# Patient Record
Sex: MALE | Race: WHITE
[De-identification: names, ages, dates, MRNs, and addresses within clinical notes are randomized per-mention and may not be internally consistent; named-entity substitution may affect disease eponyms.]

---

## 2017-02-18 ENCOUNTER — HOSPITAL ENCOUNTER (EMERGENCY)
Dept: HOSPITAL 75 - ER | Age: 37
Discharge: HOME | End: 2017-02-18
Payer: SELF-PAY

## 2017-02-18 VITALS — HEIGHT: 73 IN | BODY MASS INDEX: 24.97 KG/M2 | WEIGHT: 188.4 LBS

## 2017-02-18 VITALS — DIASTOLIC BLOOD PRESSURE: 88 MMHG | SYSTOLIC BLOOD PRESSURE: 126 MMHG

## 2017-02-18 DIAGNOSIS — M23.92: Primary | ICD-10-CM

## 2017-02-18 PROCEDURE — 96374 THER/PROPH/DIAG INJ IV PUSH: CPT

## 2017-02-18 PROCEDURE — 73562 X-RAY EXAM OF KNEE 3: CPT

## 2017-02-18 NOTE — ED LOWER EXTREMITY
General


Chief Complaint:  Lower Extremity


Stated Complaint:  L KNEE INJ


Nursing Triage Note:  


PT STATES HE WAS STEPPING OFF A LADDER AND FELT MULTIPLE POPS IN HIS LT KNEE.  


KX OF LT KNEE SURGERY.


Nursing Sepsis Screen:  No Definite Risk


Source:  patient


Exam Limitations:  no limitations





History of Present Illness


Time seen by provider:  14:32


Initial Comments


The patient is a 36-year-old white male who has had multiple knee surgeries on 

the left including anterior cruciate ligament and a torn patellar tendon.  He 

is a  and descended his ladder today and stepped off to the ground.  He 

heard a pop and his knee buckled.  The pain continues.


Onset:  just prior to arrival


Pain/Injury Location:  left knee


Method of Injury:  sports injury





Allergies and Home Medications


Allergies


Coded Allergies:  


     prednisone (Verified  Allergy, Unknown, 2/3/10)





Home Medications


Lisdexamfetamine Dimesylate 40 Mg Capsule 40 MG PO DAILY (Reported) 


Lithium Carbonate 450 Mg Tablet.sa 600 MG PO BID (Reported) 


Mirtazapine 30 Mg Tablet 45 MG PO HS (Reported) 


Quetiapine Fumarate 300 Mg Tab.sr.24h 300 MG PO HS (Reported) 





Constitutional:   see HPI


EENTM:   no symptoms reported


Respiratory:   no symptoms reported


Cardiovascular:   no symptoms reported


Gastrointestinal:   no symptoms reported


Genitourinary:   no symptoms reported


Musculoskeletal:   no symptoms reported


Skin:   no symptoms reported


Psychiatric/Neurological:   No Symptoms Reported





Past Medical-Social-Family Hx


Patient Social History


Former Smoker/When Quit:  


Recent Foreign Travel:  No


Contact w/Someone Who Travel:  No


Recent Infectious Disease Expo:  No


Recent Hopitalizations:  Yes (SEPSIS, HEART INFECTION 2003, PNEUMONIA X2, 

MULTIPLE FX)





Immunizations Up To Date


Tetanus Booster (TDap):  Less than 5yrs





Surgeries


HX Surgeries:  Yes


Surgeries:  Orthopedic





Respiratory


Hx Respiratory Disorders:  No





Cardiovascular


Hx Cardiac Disorders:  No





Neurological


Hx Neurological Disorders:  No





Reproductive System


Hx Reproductive Disorders:  No





Genitourinary


Hx Genitourinary Disorders:  No





Gastrointestinal


Hx Gastrointestinal Disorders:  No





Musculoskeletal


Hx Musculoskeletal Disorders:  Yes


Musculoskeletal Disorders:  Fractures





Endocrine


Hx Endocrine Disorders:  No





HEENT


HX ENT Disorders:  Yes (RIGHT JAW FX/ORIF)





Cancer


Hx Cancer:  No





Psychosocial


Hx Psychiatric Problems:  Yes


Behavioral Health Disorders:  ADD/ADHD, Anxiety, Bipolar, Depression





Integumentary


HX Skin/Integumentary Disorder:  No





Blood Transfusions


Hx Blood Disorders:  No





Family Medical History


Family Medial History:  


Patient reports no known family medical history.





Physical Exam


Vital Signs





 Vital Sign - Last 12Hours








 2/18/17





 14:08


 


Temp 98.8


 


Pulse 77


 


Resp 20


 


B/P 126/88


 


Pulse Ox 100


 


O2 Delivery Room Air





Capillary Refill : Less Than 3 Seconds


General Appearance:   mild distress


HEENT:   normal ENT inspection


Neck:   full range of motion


Cardiovascular:   normal peripheral pulses regular rate, rhythm no edema no 

gallop no JVD no murmur


Respiratory:   chest non-tender lungs clear normal breath sounds no respiratory 

distress no accessory muscle use


Knees:  left knee swelling


Comments


The left knee feels best held in extension.  There appears to be no defect in 

the quadriceps or patellar tendon.  There is no dislocation of the patella.  

There appears to be early swelling at the joint space.





Progress/Results/Core Measures


Results/Orders


My Orders





 Orders-JESSICA GIANG MD


Fentanyl  Injection (Sublimaze Injection (2/18/17 14:15)


Knee, Left, 3 Views (2/18/17 14:31)





Medications Given in ED





 Current Medications








 Medications  Dose


 Ordered  Sig/Daniele


 Route  Start Time


 Stop Time Status Last Admin


Dose Admin


 


 Fentanyl Citrate  50 mcg  ONCE  ONCE


 IVP  2/18/17 14:15


 2/18/17 14:16 DC 2/18/17 14:19


50 MCG








Vital Signs/I&O





 Vital Sign - Last 12Hours








 2/18/17





 14:08


 


Temp 98.8


 


Pulse 77


 


Resp 20


 


B/P 126/88


 


Pulse Ox 100


 


O2 Delivery Room Air








Blood Pressure Mean:  101





Departure


Communication


Progress Notes


Plain films of the left knee are negative.





Impression


Impression:  


 Primary Impression:  


 internal derangement left knee


Disposition:  01 HOME, SELF-CARE


Condition:  Stable/Unchanged





Departure-Patient Inst.


Decision time for Depature:  15:14


Referrals:  


[facility] (PCP/Family)


Primary Care Physician


Patient Instructions:  Knee Sprain (DC)





Add. Discharge Instructions:


All discharge instructions reviewed with patient and/or family. Voiced 

understanding.


After departure from the ER, go home and elevate and ice the knee.





It is hard to predict your future however if you're significantly swollen 

tomorrow and had difficulty walking on it your likely to need to see 

orthopedics.  If that comes to pass I would recommend Dr. Collier.





Take ibuprofen 600 mg 4 times a day


Scripts


Ibuprofen 600 Mg [id number] Mg PO Q6H PRN PAIN #100 TAB


   Prov:JESSICA GIANG MD         2/18/17








JESSICA GIANG MD Feb 18, 2017 14:36

## 2017-02-18 NOTE — DIAGNOSTIC IMAGING REPORT
INDICATION: Left knee pain. Stepped off ladder. History of

previous meniscus.



FINDINGS: Four views nonweightbearing show joint spaces to be

well preserved. No dislocations are seen. There are no

fractures. The articulating surfaces of the femoral condyles are

smooth. No loose bodies demonstrated.



IMPRESSION: Negative left knee.



Dictated by:



Dictated on workstation # [id number]

## 2020-03-07 ENCOUNTER — HOSPITAL ENCOUNTER (EMERGENCY)
Dept: HOSPITAL 75 - ER | Age: 40
Discharge: HOME | End: 2020-03-07
Payer: SELF-PAY

## 2020-03-07 VITALS — HEIGHT: 72.99 IN | BODY MASS INDEX: 26.41 KG/M2 | WEIGHT: 199.3 LBS

## 2020-03-07 VITALS — SYSTOLIC BLOOD PRESSURE: 120 MMHG | DIASTOLIC BLOOD PRESSURE: 80 MMHG

## 2020-03-07 DIAGNOSIS — S61.214A: Primary | ICD-10-CM

## 2020-03-07 DIAGNOSIS — F17.210: ICD-10-CM

## 2020-03-07 DIAGNOSIS — Z88.8: ICD-10-CM
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
## 2020-03-07 DIAGNOSIS — F41.9: ICD-10-CM

## 2020-03-07 DIAGNOSIS — F31.9: ICD-10-CM

## 2020-03-07 DIAGNOSIS — Z23: ICD-10-CM

## 2020-03-07 DIAGNOSIS — F90.9: ICD-10-CM

## 2020-03-07 PROCEDURE — 12001 RPR S/N/AX/GEN/TRNK 2.5CM/<: CPT

## 2020-03-07 PROCEDURE — 64450 NJX AA&/STRD OTHER PN/BRANCH: CPT

## 2020-03-07 PROCEDURE — 90715 TDAP VACCINE 7 YRS/> IM: CPT

## 2020-03-07 NOTE — ED INTEGUMENTARY GENERAL
General


Chief Complaint:  Laceration


Stated Complaint:  R RING FINGER LAC


Nursing Triage Note:  


PT AMB TO [facility] WITH COMPLAINT OF LACERATION ON RIGHT RING FINGER. UNKNOWN LAST 


TETANUS


Source:  patient


Exam Limitations:  no limitations





History of Present Illness


Date Seen by Provider:  Mar 7, 2020


Time Seen by Provider:  11:30


Initial Comments


40 YO male patient presents for c/o a laceration to the right 4th finger.  He 

states he had the electric roya locked, but his finger slipped and hit the 

blades.  He isn't sure how long it has been since his last tetanus.


Location Injury Occurred:  outside home


Timing/Duration:  just prior to arrival (1 hour prior to arrival.), constant


Location:  hands (rt 4th finger)


Possible Cause:  other (see HPI)


Modifying Factors:  worse with other (tenderness with palpation)





Allergies and Home Medications


Allergies


Coded Allergies:  


     prednisone (Verified  Allergy, Unknown, 2/3/10)





Home Medications


Ibuprofen 600 Mg Tablet, 600 MG PO Q6H PRN for PAIN


   Prescribed by: JESSICA GIANG on 2/18/17 1521


Lisdexamfetamine Dimesylate 40 Mg Capsule, 40 MG PO DAILY, (Reported)


Lithium Carbonate 450 Mg Tablet.sa, 600 MG PO BID, (Reported)


Mirtazapine 30 Mg Tablet, 45 MG PO HS, (Reported)


Quetiapine Fumarate 300 Mg Tab.sr.24h, 300 MG PO HS, (Reported)





Patient Home Medication List


Home Medication List Reviewed:  Yes





Review of Systems


Review of Systems


Constitutional:  no symptoms reported


Musculoskeletal:  No joint pain, No joint swelling


Skin:  see HPI


Psychiatric/Neurological:  Denies Numbness, Denies Paresthesia, Denies Tingling





All Other Systems Reviewed


Negative Unless Noted:  Yes (Negative excepted noted.)





Past Medical-Social-Family Hx


Past Med/Social Hx:  Reviewed Nursing Past Med/Soc Hx


Patient Social History


Alcohol Use:  Denies Use


Recreational Drug Use:  No


Smoking Status:  Current Everyday Smoker


Type Used:  Cigarettes


Recent Foreign Travel:  No


Contact w/Someone Who Travel:  No


Recent Infectious Disease Expo:  No


Recent Hopitalizations:  No





Immunizations Up To Date


Tetanus Booster (TDap):  Unknown





Seasonal Allergies


Seasonal Allergies:  No





Past Medical History


Surgeries:  Yes (LT KNEE AND QUAD, RT SHOULDER, RT KNEE, LT WRIST, JAW 

RECONSTRUCTION)


Orthopedic


Respiratory:  No


Cardiac:  No


Neurological:  No


Reproductive Disorders:  No


Gastrointestinal:  No


Musculoskeletal:  Yes


Fractures


Endocrine:  No


Cancer:  No


Psychosocial:  Yes


ADD/ADHD, Anxiety, Bipolar, Depression


Integumentary:  No


Blood Disorders:  No





Family Medical History


Reviewed Nursing Family Hx





Patient reports no known family medical history.


No Pertinent Family Hx





Physical Exam


Vital Signs





Vital Signs - First Documented








 3/7/20





 10:30


 


Temp 36.7


 


Pulse 74


 


Resp 16


 


B/P (MAP) 117/83 (94)


 


Pulse Ox 99


 


O2 Delivery Room Air





Capillary Refill : Less Than 3 Seconds


General Appearance:  WD/WN, no apparent distress


Cardiovascular:  normal peripheral pulses, regular rate, rhythm, no murmur


Respiratory:  lungs clear, normal breath sounds, no respiratory distress, no 

accessory muscle use


Extremities:  normal range of motion, normal capillary refill, other (2.5 cm 

flap laceration to the anterior distal fourth finger.  Mild soft tissue 

tenderness and swelling noted.)


Neurologic/Psychiatric:  no motor/sensory deficits, alert, normal mood/affect, 

oriented x 3


Skin:  normal color, warm/dry, other (2.5 cm flap laceration to the anterior 

distal fourth finger.  Mild soft tissue tenderness and swelling noted.)


Skin Problem Location:  upper extremities (right fourth finger)


Skin Problem Character:  other (2.5 cm flap laceration to the anterior distal 

fourth finger.  Mild soft tissue tenderness and swelling noted.)





Procedures/Interventions





   Wound Location:  Upper Extremities (right fourth distal finger)


   Wound Length (cm):  2.5


   Wound's Depth, Shape:  superficial, flap


   Wound Explored:  contaminated


   Irrigated w/ Saline (ccs):  200


   Betadine Prep?:  Yes (scrubbed vigorously with chlorhexidine and sterile 

saline)


   Volume Anesthetic (ccs):  3 (0.5 percent Marcaine with 1 percent lidocaine 

1:1 ratio)


   Suture:  Ethlion


   Suture Size:  4-0


   Number of Sutures:  3


   Layer Closure?:  1


   Sterile Dressing Applied?:  Yes


Progress


Blood loss minimal.  Patient tolerated the procedure well.





Progress/Results/Core Measures


Results/Orders


My Orders





Orders - STEPAN OJEDA Pertuss(Gareth),Tet Adult (Boostrix (3/7/20 11:45)


Bupivacaine 0.5% Injection (Sensorcaine (3/7/20 11:45)


Lidocaine 1% Inj 20 Ml (Xylocaine 1% Inj (3/7/20 11:45)





Medications Given in ED





Current Medications








 Medications  Dose


 Ordered  Sig/Daniele


 Route  Start Time


 Stop Time Status Last Admin


Dose Admin


 


 Bupivacaine HCl  30 ml  ONCE  ONCE


 INJ  3/7/20 11:45


 3/7/20 11:46 DC 3/7/20 11:51


30 ML


 


 Diphtheria/


 Tetanus/Acell


 Pertussis  0.5 ml  ONCE ONCE


 IM  3/7/20 11:45


 3/7/20 11:46 DC 3/7/20 11:52


0.5 ML


 


 Lidocaine HCl  20 ml  ONCE  ONCE


 INJ  3/7/20 11:45


 3/7/20 11:46 DC 3/7/20 11:51


20 ML








Vital Signs/I&O











 3/7/20





 10:30


 


Temp 36.7


 


Pulse 74


 


Resp 16


 


B/P (MAP) 117/83 (94)


 


Pulse Ox 99


 


O2 Delivery Room Air














Blood Pressure Mean:                    94











Departure


Communication (Admissions)


Patient seen, evaluated, and laceration repair performed.  plan for dsch to 

home.





Impression





   Primary Impression:  


   Laceration of right ring finger w/o foreign body w/o damage to nail


   Qualified Codes:  S61.214A - Laceration without foreign body of right ring 

   finger without damage to nail, initial encounter


Disposition:  01 HOME, SELF-CARE


Condition:  Improved





Departure-Patient Inst.


Decision time for Depature:  11:52


Referrals:  


[facility] (PCP/Family)


Primary Care Physician


Patient Instructions:  Laceration Repair With Stitches (DC)





Add. Discharge Instructions:  


All discharge instructions reviewed with patient and/or family. Voiced 

understanding.  Tylenol over the counter as directed for pain.  Motrin over the 

counter as directed for pain.  Tomorrow morning remove the bandage and shower 

with antibacterial soap.  Apply triple antibiotic ointment twice daily for 3 

days and cover with a bandaid.  Return to the emergency department in 10 days 

for suture removal.  Follow up with your family practitioner if needed.  Return 

to the emergency department for worsened pain, redness, fever, drainage, or any 

other concerns.











STEPAN OJEDA            Mar 7, 2020 11:30

## 2020-04-13 ENCOUNTER — HOSPITAL ENCOUNTER (EMERGENCY)
Dept: HOSPITAL 75 - ER | Age: 40
Discharge: HOME | End: 2020-04-13
Payer: SELF-PAY

## 2020-04-13 VITALS — SYSTOLIC BLOOD PRESSURE: 125 MMHG | DIASTOLIC BLOOD PRESSURE: 73 MMHG

## 2020-04-13 VITALS — WEIGHT: 194.89 LBS | HEIGHT: 72.99 IN | BODY MASS INDEX: 25.83 KG/M2

## 2020-04-13 DIAGNOSIS — F41.9: ICD-10-CM

## 2020-04-13 DIAGNOSIS — F90.9: ICD-10-CM

## 2020-04-13 DIAGNOSIS — K02.9: ICD-10-CM

## 2020-04-13 DIAGNOSIS — F17.210: ICD-10-CM

## 2020-04-13 DIAGNOSIS — K04.7: Primary | ICD-10-CM

## 2020-04-13 DIAGNOSIS — F31.9: ICD-10-CM

## 2020-04-13 PROCEDURE — 99282 EMERGENCY DEPT VISIT SF MDM: CPT

## 2020-04-13 NOTE — ED EENT
History of Present Illness


General


Chief Complaint:  Dental Problems/Pain


Stated Complaint:  DENTAL PAIN


Nursing Triage Note:  


Pt c/o rop R sided dental infection.  Pt has broken and missing teeth.  Pt 


reports pockets of puss begining on Friday.  Pt tried to get appointment with 


[facility], but can't get in until tomorrow.





History of Present Illness


Date Seen by Provider:  Apr 13, 2020


Time Seen by Provider:  14:05


Initial Comments


39-year-old  male reports for dental pain on the right upper and lower 

molars. He has had long-standing dental abscesses and tooth decay. He has an 

appointment with [facility] dental tomorrow. He is having to have 

the teeth pulled at some point in the near future. He has not been taking 

antibiotics for his teeth for last 2 months.


Timing/Duration:  other (intermittent)


Severity:  mild


Prearrival Treatment:  over the counter meds


Associated Symptoms:  No fever, No poor fluid intake, No poor solids intake; 

tooth pain





Allergies and Home Medications


Allergies


Coded Allergies:  


     prednisone (Verified  Allergy, Unknown, 2/3/10)





Home Medications


Amoxicillin 500 Mg Capsule, 500 MG PO TID


   Prescribed by: BALDEMAR BREWSTER on 4/13/20 1422


Ibuprofen 600 Mg Tablet, 600 MG PO Q6H PRN for PAIN


   Prescribed by: JESSICA GIANG on 2/18/17 1521


Lisdexamfetamine Dimesylate 40 Mg Capsule, 40 MG PO DAILY, (Reported)


Lithium Carbonate 450 Mg Tablet.sa, 600 MG PO BID, (Reported)


Mirtazapine 30 Mg Tablet, 45 MG PO HS, (Reported)


Quetiapine Fumarate 300 Mg Tab.sr.24h, 300 MG PO HS, (Reported)





Patient Home Medication List


Home Medication List Reviewed:  Yes





Review of Systems


Review of Systems


Constitutional:  no symptoms reported, see HPI


Mouth:  see HPI, loose teeth, pain





All Other Systems Reviewed


Negative Unless Noted:  Yes





Past Medical-Social-Family Hx


Past Med/Social Hx:  Reviewed Nursing Past Med/Soc Hx


Patient Social History


Alcohol Use:  Denies Use


Recreational Drug Use:  No


Drug of Choice:  hx


Smoking Status:  Current Everyday Smoker


Type Used:  Cigarettes


2nd Hand Smoke Exposure:  Yes


Recent Foreign Travel:  No


Contact w/Someone Who Travel:  No


Recent Infectious Disease Expo:  No


Recent Hopitalizations:  No





Immunizations Up To Date


Tetanus Booster (TDap):  Unknown





Seasonal Allergies


Seasonal Allergies:  No





Past Medical History


Surgeries:  Yes (LT KNEE AND QUAD, RT SHOULDER, RT KNEE, LT WRIST, JAW 

RECONSTRUCTION)


Orthopedic


Respiratory:  No


Cardiac:  No


Neurological:  No


Reproductive Disorders:  No


Gastrointestinal:  No


Musculoskeletal:  Yes


Fractures


Endocrine:  No


Cancer:  No


Psychosocial:  Yes


ADD/ADHD, Anxiety, Bipolar, Depression


Integumentary:  No


Blood Disorders:  No





Family Medical History





Patient reports no known family medical history.


No Pertinent Family Hx





Physical Exam


Vital Signs





Vital Signs - First Documented








 4/13/20





 14:02


 


Temp 36.4


 


Pulse 77


 


Resp 14


 


B/P (MAP) 125/73 (90)


 


Pulse Ox 98


 


O2 Delivery Room Air








Height, Weight, BMI


Height: 6'1"


Weight: 188lbs. 6.4oz. [id number]; 25.00 BMI


Method:Stated


General Appearance:  WD/WN, no apparent distress


Ears:  bilateral ear auricle normal, bilateral ear canal normal, bilateral ear 

TM normal


Nose:  normal inspection; No discharge


Mouth/Throat:  pharynx normal, dental tenderness (Right upper and lower molars, 

with moderate decay, Gingiva swollen, friable and erythema. ); No maxillary 

swelling, No tonsillar exudate


Neck:  non-tender, full range of motion, supple, normal inspection


Cardiovascular:  normal peripheral pulses, regular rate, rhythm


Respiratory:  chest non-tender, lungs clear, normal breath sounds


Neurologic/Psychiatric:  no motor/sensory deficits, alert, normal mood/affect, 

oriented x 3


Skin:  normal color, warm/dry





Procedures/Interventions





   Suture Size:  4-0





Progress/Results/Core Measures


Results/Orders


Vital Signs/I&O











 4/13/20 4/13/20





 14:02 14:26


 


Temp 36.4 36.4


 


Pulse 77 77


 


Resp 14 14


 


B/P (MAP) 125/73 (90) 125/73 (90)


 


Pulse Ox 98 98


 


O2 Delivery Room Air Room Air














Blood Pressure Mean:                    90











Departure


Impression





   Primary Impression:  


   Dental abscess


   Additional Impression:  


   Dental caries


Disposition:  01 HOME, SELF-CARE


Condition:  Improved





Departure-Patient Inst.


Decision time for Depature:  14:20


Referrals:  


[facility] (PCP/Family)


Primary Care Physician


Patient Instructions:  Dental Pain (DC), Tooth Abscess (DC)





Add. Discharge Instructions:  


Rinse mouth with Peroxide/Water 50/50 or Listerine, 4 times daily. 


Take antibiotics, as prescribed. 


Keep your scheduled dental appointment tomorrow.


Alternate between ibuprofen 600 mg and Tylenol 650 mg every 4 hours for pain.


Return to the emergency department for new, urgent health care problems.





All discharge instructions reviewed with patient and/or family. Voiced 

understanding.


Scripts


Amoxicillin (Amoxicillin) 500 Mg Capsule


500 MG PO TID, #21 CAP 0 Refills


   Prov: BALDEMAR BREWSTER         4/13/20


Work/School Note:  Work Release Form   Date Seen in the Emergency Department:  

Apr 13, 2020


   Return to Work:  Apr 14, 2020


   Restrictions:  No Restrictions











BALDEMAR BREWSTER                  Apr 13, 2020 14:23

## 2020-05-11 ENCOUNTER — HOSPITAL ENCOUNTER (EMERGENCY)
Dept: HOSPITAL 75 - ER | Age: 40
Discharge: HOME | End: 2020-05-11
Payer: SELF-PAY

## 2020-05-11 VITALS — SYSTOLIC BLOOD PRESSURE: 126 MMHG | DIASTOLIC BLOOD PRESSURE: 73 MMHG

## 2020-05-11 VITALS — BODY MASS INDEX: 25.13 KG/M2 | WEIGHT: 189.6 LBS | HEIGHT: 72.83 IN
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
## 2020-05-11 DIAGNOSIS — Z88.8: ICD-10-CM

## 2020-05-11 DIAGNOSIS — Z77.22: ICD-10-CM

## 2020-05-11 DIAGNOSIS — F90.9: ICD-10-CM

## 2020-05-11 DIAGNOSIS — F41.9: ICD-10-CM

## 2020-05-11 DIAGNOSIS — S43.402A: Primary | ICD-10-CM

## 2020-05-11 DIAGNOSIS — F31.9: ICD-10-CM

## 2020-05-11 PROCEDURE — 73030 X-RAY EXAM OF SHOULDER: CPT

## 2020-05-11 NOTE — DIAGNOSTIC IMAGING REPORT
HISTORY: 

Pain in left shoulder with decreased range of motion.



TECHNIQUE: 

Three views of the left shoulder.



COMPARISON: 

None.



FINDINGS:

No acute fracture or dislocation is seen in the left shoulder.

Alignment appears normal. Joint spaces are preserved. There is

irregularity which appears chronic at the coracoclavicular

interval which may be from remote trauma. There is a linear

density measuring 2.4 cm in length seen at the soft tissues of

the anterior left upper arm.



IMPRESSION:

1. No acute fracture is seen in the left shoulder.

2. Chronic findings at the left coracoclavicular interval may be

from remote trauma.

3. Linear density at the soft tissues of the anterior left upper

arm could be external to the patient or possibly a foreign body.



Dictated by: 



  Dictated on workstation # [id number]

## 2020-05-11 NOTE — ED UPPER EXTREMITY
General


Stated Complaint:  SHOULDER PAIN


Source:  patient


Exam Limitations:  no limitations





History of Present Illness


Date Seen by Provider:  May 11, 2020


Time Seen by Provider:  11:58


Initial Comments


39-year-old male presents with left shoulder pain. Patient reports that he was 

lifting some plywood yesterday developed some shoulder pain last night. Reports 

that now he has difficulty with abduction with ability to go out about 90. 

Patient does not have any deformity. The pain is in general shoulder region. 

Patient denies any other injury.





Allergies and Home Medications


Allergies


Coded Allergies:  


     prednisone (Verified  Allergy, Unknown, 2/3/10)





Home Medications


Amoxicillin 500 Mg Capsule, 500 MG PO TID


   Prescribed by: BALDEMAR BREWSTER on 20 1422


Ibuprofen 600 Mg Tablet, 600 MG PO Q6H PRN for PAIN


   Prescribed by: JESSICA GIANG on 17 1521


Lisdexamfetamine Dimesylate 40 Mg Capsule, 40 MG PO DAILY, (Reported)


Lithium Carbonate 450 Mg Tablet.sa, 600 MG PO BID, (Reported)


Mirtazapine 30 Mg Tablet, 45 MG PO HS, (Reported)


Quetiapine Fumarate 300 Mg Tab.sr.24h, 300 MG PO HS, (Reported)





Patient Home Medication List


Home Medication List Reviewed:  Yes





Review of Systems


Constitutional:  No chills, No fever


EENTM:  no symptoms reported


Respiratory:  no symptoms reported


Cardiovascular:  no symptoms reported


Gastrointestinal:  no symptoms reported


Musculoskeletal:  see HPI





Past Medical-Social-Family Hx


Past Med/Social Hx:  Reviewed Nursing Past Med/Soc Hx


Patient Social History


Drug of Choice:  hx


Type Used:  Cigarettes


2nd Hand Smoke Exposure:  Yes


Recent Foreign Travel:  No


Contact w/Someone Who Travel:  No


Recent Hopitalizations:  No





Immunizations Up To Date


Tetanus Booster (TDap):  Unknown





Seasonal Allergies


Seasonal Allergies:  No





Past Medical History


Surgeries:  Yes (LT KNEE AND QUAD, RT SHOULDER, RT KNEE, LT WRIST, JAW 

RECONSTRUCTION)


Orthopedic


Respiratory:  No


Cardiac:  No


Neurological:  No


Reproductive Disorders:  No


Gastrointestinal:  No


Musculoskeletal:  Yes


Fractures


Endocrine:  No


Cancer:  No


Psychosocial:  Yes


ADD/ADHD, Anxiety, Bipolar, Depression


Integumentary:  No


Blood Disorders:  No





Family Medical History





Patient reports no known family medical history.


No Pertinent Family Hx





Physical Exam


Vital Signs


Capillary Refill :


Height, Weight, BMI


Height: 6'1"


Weight: 188lbs. 6.4oz. 85.953316wh; 25.00 BMI


Method:Stated


General Appearance:  WD/WN, no apparent distress


Cardiovascular:  normal peripheral pulses, regular rate, rhythm


Respiratory:  lungs clear, normal breath sounds, no respiratory distress


Gastrointestinal:  non tender, soft


Shoulder:  limited ROM (patient with pain with abduction at 90. Full Flexion 

and extension.), soft tissue tenderness


Elbow/Forearm:  normal inspection


Wrist:  Yes normal inspection


Hand:  normal inspection


Neurologic/Psychiatric:  alert, normal mood/affect, oriented x 3


Skin:  normal color, warm/dry





Procedures/Interventions





   Suture Size:  4-0





Progress/Results/Core Measures


Results/Orders


My Orders





Orders - HARJEET CHAVEZ DO


Shoulder, Left, 3 Views (20 11:58)








Progress


Progress Note :  


   Time:  12:50


Progress Note


Patient with no acute with x-ray. Discussed with him that if symptoms did not 

improve he should follow-up with her orthopedic surgeon for further evaluation 

of his shoulder, possible physical therapy and possible MRI.





Diagnostic Imaging





   Diagonstic Imaging:  Xray


Comments


                                [place], [place]





NAME:   CARTER LANDIN NANCY LOPEZ


MED REC#:   [id number]


ACCOUNT#:   [id number]


PT STATUS:   REG ER


:   1980


PHYSICIAN:   HARJEET CHAVEZ DO


ADMIT DATE:   20/ER


                                   ***Draft***


Date of Exam:20





SHOULDER, LEFT, 3 VIEWS








HISTORY: 


Pain in left shoulder with decreased range of motion.





TECHNIQUE: 


Three views of the left shoulder.





COMPARISON: 


None.





FINDINGS:


No acute fracture or dislocation is seen in the left shoulder.


Alignment appears normal. Joint spaces are preserved. There is


irregularity which appears chronic at the coracoclavicular


interval which may be from remote trauma. There is a linear


density measuring 2.4 cm in length seen at the soft tissues of


the anterior left upper arm.





IMPRESSION:


1. No acute fracture is seen in the left shoulder.


2. Chronic findings at the left coracoclavicular interval may be


from remote trauma.


3. Linear density at the soft tissues of the anterior left upper


arm could be external to the patient or possibly a foreign body.


   Reviewed:  Reviewed by Me, Reviewed/Discussed





Departure


Impression





   Primary Impression:  


   Sprain of left shoulder joint


   Qualified Codes:  S43.402A - Unspecified sprain of left shoulder joint, 

   initial encounter


Disposition:  01 HOME, SELF-CARE


Condition:  Stable





Departure-Patient Inst.


Referrals:  


[facility] (PCP/Family)


Primary Care Physician








SCHWAB,TERRY D MD


Patient Instructions:  Shoulder Sprain (DC)





Add. Discharge Instructions:  


Ibuprofen 800 mg 3 times a day


4% topical lidocaine with menthol cream or gel to affected area as directed on 

package


If no improvement in one week follow-up with orthopedic surgeon for further 

evaluation





Emergency department focuses on treating and ruling out life-threatening 

diseases. Whenever possible, a diagnosis is given. However, most patients are 

given an impression based on their  history, physical exam, and workup during 

your brief time in the ER. Information about probable diagnosis and other 

educational material has been provided. Please take the time to read and 

understand this information.





It is very important that you follow up with a physician as discussed during the

visit today. Failure to adhere to your follow-up instructions may lead to severe

disability, injury, or death so please make sure to keep your appointments or 

obtain  one as requested. Please keep in mind the emergency department is not 

designed to your primary care or "family doctor" and nonurgent issues are best 

evaluated by an outpatient physician











HARJEET CHAVEZ DO               May 11, 2020 11:58

## 2020-08-30 ENCOUNTER — HOSPITAL ENCOUNTER (EMERGENCY)
Dept: HOSPITAL 75 - ER | Age: 40
Discharge: HOME | End: 2020-08-30
Payer: SELF-PAY

## 2020-08-30 VITALS — WEIGHT: 178.57 LBS | BODY MASS INDEX: 24.45 KG/M2 | HEIGHT: 71.65 IN

## 2020-08-30 VITALS — DIASTOLIC BLOOD PRESSURE: 85 MMHG | SYSTOLIC BLOOD PRESSURE: 154 MMHG

## 2020-08-30 VITALS — SYSTOLIC BLOOD PRESSURE: 134 MMHG | DIASTOLIC BLOOD PRESSURE: 102 MMHG

## 2020-08-30 DIAGNOSIS — R82.5: ICD-10-CM

## 2020-08-30 DIAGNOSIS — Z20.828: ICD-10-CM

## 2020-08-30 DIAGNOSIS — F17.210: ICD-10-CM

## 2020-08-30 DIAGNOSIS — N39.0: ICD-10-CM

## 2020-08-30 DIAGNOSIS — F31.9: ICD-10-CM

## 2020-08-30 DIAGNOSIS — F90.9: ICD-10-CM

## 2020-08-30 DIAGNOSIS — F41.9: ICD-10-CM

## 2020-08-30 DIAGNOSIS — J11.1: Primary | ICD-10-CM

## 2020-08-30 DIAGNOSIS — Z88.8: ICD-10-CM

## 2020-08-30 LAB
ALBUMIN SERPL-MCNC: 4.9 GM/DL (ref 3.2–4.5)
ALP SERPL-CCNC: 59 U/L (ref 40–136)
ALT SERPL-CCNC: 24 U/L (ref 0–55)
APTT PPP: YELLOW S
BACTERIA #/AREA URNS HPF: (no result) /HPF
BARBITURATES UR QL: NEGATIVE
BASOPHILS # BLD AUTO: 0 10^3/UL (ref 0–0.1)
BASOPHILS NFR BLD AUTO: 0 % (ref 0–10)
BASOPHILS NFR BLD MANUAL: 0 %
BENZODIAZ UR QL SCN: NEGATIVE
BILIRUB SERPL-MCNC: 0.5 MG/DL (ref 0.1–1)
BILIRUB UR QL STRIP: NEGATIVE
BUN/CREAT SERPL: 10
CALCIUM SERPL-MCNC: 11.4 MG/DL (ref 8.5–10.1)
CHLORIDE SERPL-SCNC: 103 MMOL/L (ref 98–107)
CK SERPL-CCNC: 231 U/L (ref 30–200)
CO2 SERPL-SCNC: 25 MMOL/L (ref 21–32)
COCAINE UR QL: NEGATIVE
CREAT SERPL-MCNC: 1.24 MG/DL (ref 0.6–1.3)
EOSINOPHIL # BLD AUTO: 0.1 10^3/UL (ref 0–0.3)
EOSINOPHIL NFR BLD AUTO: 1 % (ref 0–10)
EOSINOPHIL NFR BLD MANUAL: 0 %
ERYTHROCYTE [DISTWIDTH] IN BLOOD BY AUTOMATED COUNT: 14.3 % (ref 10–14.5)
FIBRINOGEN PPP-MCNC: CLEAR MG/DL
GFR SERPLBLD BASED ON 1.73 SQ M-ARVRAT: > 60 ML/MIN
GLUCOSE SERPL-MCNC: 106 MG/DL (ref 70–105)
GLUCOSE UR STRIP-MCNC: NEGATIVE MG/DL
HCT VFR BLD CALC: 43 % (ref 40–54)
HGB BLD-MCNC: 14.5 G/DL (ref 13.3–17.7)
KETONES UR QL STRIP: NEGATIVE
LEUKOCYTE ESTERASE UR QL STRIP: NEGATIVE
LYMPHOCYTES # BLD AUTO: 3 X 10^3 (ref 1–4)
LYMPHOCYTES NFR BLD AUTO: 19 % (ref 12–44)
MAGNESIUM SERPL-MCNC: 2.4 MG/DL (ref 1.6–2.4)
MANUAL DIFFERENTIAL PERFORMED BLD QL: YES
MCH RBC QN AUTO: 32 PG (ref 25–34)
MCHC RBC AUTO-ENTMCNC: 34 G/DL (ref 32–36)
MCV RBC AUTO: 93 FL (ref 80–99)
METHADONE UR QL SCN: NEGATIVE
METHAMPHETAMINE SCREEN URINE S: POSITIVE
MONOCYTES # BLD AUTO: 1.5 X 10^3 (ref 0–1)
MONOCYTES NFR BLD AUTO: 9 % (ref 0–12)
MONOCYTES NFR BLD: 6 %
NEUTROPHILS # BLD AUTO: 11.2 X 10^3 (ref 1.8–7.8)
NEUTROPHILS NFR BLD AUTO: 71 % (ref 42–75)
NEUTS BAND NFR BLD MANUAL: 71 %
NEUTS BAND NFR BLD: 3 %
NITRITE UR QL STRIP: NEGATIVE
OPIATES UR QL SCN: NEGATIVE
OXYCODONE UR QL: NEGATIVE
PH UR STRIP: 7 [PH] (ref 5–9)
PLATELET # BLD: 315 10^3/UL (ref 130–400)
PMV BLD AUTO: 10.3 FL (ref 7.4–10.4)
POTASSIUM SERPL-SCNC: 4.1 MMOL/L (ref 3.6–5)
PROPOXYPH UR QL: NEGATIVE
PROT SERPL-MCNC: 7.9 GM/DL (ref 6.4–8.2)
PROT UR QL STRIP: (no result)
RBC #/AREA URNS HPF: (no result) /HPF
RBC MORPH BLD: NORMAL
SODIUM SERPL-SCNC: 140 MMOL/L (ref 135–145)
SP GR UR STRIP: 1.02 (ref 1.02–1.02)
TRICYCLICS UR QL SCN: NEGATIVE
VARIANT LYMPHS NFR BLD MANUAL: 20 %
WBC # BLD AUTO: 15.8 10^3/UL (ref 4.3–11)
WBC #/AREA URNS HPF: (no result) /HPF

## 2020-08-30 PROCEDURE — 82550 ASSAY OF CK (CPK): CPT

## 2020-08-30 PROCEDURE — 80178 ASSAY OF LITHIUM: CPT

## 2020-08-30 PROCEDURE — 36415 COLL VENOUS BLD VENIPUNCTURE: CPT

## 2020-08-30 PROCEDURE — 80053 COMPREHEN METABOLIC PANEL: CPT

## 2020-08-30 PROCEDURE — 71045 X-RAY EXAM CHEST 1 VIEW: CPT

## 2020-08-30 PROCEDURE — 80320 DRUG SCREEN QUANTALCOHOLS: CPT

## 2020-08-30 PROCEDURE — 85027 COMPLETE CBC AUTOMATED: CPT

## 2020-08-30 PROCEDURE — 87491 CHLMYD TRACH DNA AMP PROBE: CPT

## 2020-08-30 PROCEDURE — 87591 N.GONORRHOEAE DNA AMP PROB: CPT

## 2020-08-30 PROCEDURE — 81000 URINALYSIS NONAUTO W/SCOPE: CPT

## 2020-08-30 PROCEDURE — 86141 C-REACTIVE PROTEIN HS: CPT

## 2020-08-30 PROCEDURE — 99284 EMERGENCY DEPT VISIT MOD MDM: CPT

## 2020-08-30 PROCEDURE — 83615 LACTATE (LD) (LDH) ENZYME: CPT

## 2020-08-30 PROCEDURE — 87088 URINE BACTERIA CULTURE: CPT

## 2020-08-30 PROCEDURE — 85007 BL SMEAR W/DIFF WBC COUNT: CPT

## 2020-08-30 PROCEDURE — 87635 SARS-COV-2 COVID-19 AMP PRB: CPT

## 2020-08-30 PROCEDURE — 83735 ASSAY OF MAGNESIUM: CPT

## 2020-08-30 PROCEDURE — 80306 DRUG TEST PRSMV INSTRMNT: CPT

## 2020-08-30 NOTE — ED GENERAL
General


Chief Complaint:  Respiratory Problems


Stated Complaint:  SOB


Nursing Triage Note:  


Pt here with cough, sob, and general melaise x 4-5 days. Pt denies known 


exposure to positive Covid and states he wears a mask "for the most part". Pt 


states he does have a hx of "heart infection" from a dirty needle 15 years ago 


but no longer uses drugs.


Nursing Sepsis Screen:  No Definite Risk


Source of Information:  Patient


Exam Limitations:  No Limitations





History of Present Illness


Date Seen by Provider:  Aug 30, 2020


Time Seen by Provider:  19:20


Initial Comments


This 40-year-old man presents to emergency room with several days of flulike 

symptoms including mild cough, shortness of breath, extreme fatigue, urinary 

frequency, nausea, vomiting, diarrhea, headache, and myalgias.  He has no known 

exposures to COVID-19 but he has been social and was at a party several days 

ago.  He has a history of polysubstance abuse and admits to recently using 

marijuana.  He is afebrile.





Allergies and Home Medications


Allergies


Coded Allergies:  


     prednisone (Verified  Allergy, Unknown, 2/3/10)





Home Medications


Amoxicillin 500 Mg Capsule, 500 MG PO TID


   Prescribed by: BALDEMAR BREWSTER on 20 1422


Cephalexin 500 Mg Capsule, 500 MG PO TID


   Prescribed by: HARDEEP ROGERS on 20


Ibuprofen 600 Mg Tablet, 600 MG PO Q6H PRN for PAIN


   Prescribed by: JESSICA GIANG on 17 1521


Lisdexamfetamine Dimesylate 40 Mg Capsule, 40 MG PO DAILY, (Reported)


Lithium Carbonate 450 Mg Tablet.sa, 600 MG PO BID, (Reported)


Mirtazapine 30 Mg Tablet, 45 MG PO HS, (Reported)


Ondansetron 4 Mg Tab.rapdis, 4 MG SL Q4H PRN for NAUSEA/VOMITING


   Prescribed by: HARDEEP ROGERS on 20


Quetiapine Fumarate 300 Mg Tab.sr.24h, 300 MG PO HS, (Reported)





Patient Home Medication List


Home Medication List Reviewed:  Yes





Review of Systems


Review of Systems


Constitutional:  see HPI


EENTM:  no symptoms reported


Respiratory:  see HPI


Cardiovascular:  other (tachycardia)


Gastrointestinal:  see HPI


Genitourinary:  no symptoms reported


Musculoskeletal:  see HPI


Skin:  no symptoms reported


Psychiatric/Neurological:  See HPI


Hematologic/Lymphatic:  No Symptoms Reported


Immunological/Allergic:  no symptoms reported





Past Medical-Social-Family Hx


Past Med/Social Hx:  Reviewed Nursing Past Med/Soc Hx


Patient Social History


Alcohol Use:  Occasionally Uses


Recreational Drug Use:  Yes


Drug of Choice:  history of IV drug use


Smoking Status:  Current Everyday Smoker


Type Used:  Cigarettes


2nd Hand Smoke Exposure:  Yes


Recent Foreign Travel:  No


Contact w/Someone Who Travel:  No


Recent Infectious Disease Expo:  No


Recent Hopitalizations:  No





Immunizations Up To Date


Tetanus Booster (TDap):  Unknown





Seasonal Allergies


Seasonal Allergies:  No





Past Medical History


Surgeries:  Yes (LT KNEE AND QUAD, RT SHOULDER, RT KNEE, LT WRIST, JAW 

RECONSTRUCTION)


Orthopedic


Respiratory:  No


Cardiac:  Yes


Endocarditis


Neurological:  No


Reproductive Disorders:  No


Gastrointestinal:  No


Musculoskeletal:  Yes


Fractures


Endocrine:  No


HEENT:  No


Cancer:  No


Psychosocial:  Yes


ADD/ADHD, Anxiety, Bipolar, Depression


Integumentary:  No


Blood Disorders:  No





Family Medical History





Patient reports no known family medical history.


No Pertinent Family Hx





Physical Exam


Vital Signs





Vital Signs - First Documented








 20





 19:14 20:44


 


Temp 37.0 


 


Pulse 117 


 


Resp 18 


 


B/P (MAP) 141/91 (108) 


 


Pulse Ox  100


 


O2 Delivery Room Air 





Capillary Refill : Less Than 3 Seconds


Height, Weight, BMI


Height: 6'1"


Weight: 188lbs. 6.4oz. 85.489298ve; 24.00 BMI


Method:Stated


General Appearance:  No Apparent Distress, WD/WN, Thin


HEENT:  PERRL/EOMI, Normal ENT Inspection, Other (mucous membranes somewhat dry)


Neck:  Normal Inspection


Respiratory:  Lungs Clear, Normal Breath Sounds, No Accessory Muscle Use, No 

Respiratory Distress


Cardiovascular:  No Edema, No Murmur, Tachycardia


Gastrointestinal:  Normal Bowel Sounds, Non Tender, Soft


Extremity:  Normal Inspection, No Pedal Edema


Neurologic/Psychiatric:  Alert, Oriented x3, No Motor/Sensory Deficits, Normal M

ood/Affect, CNs II-XII Norm as Tested


Skin:  Normal Color, Warm/Dry





Procedures/Interventions





   Suture Size:  4-0





Progress/Results/Core Measures


Suspected Sepsis


Recent Fever Within 48 Hours:  No


Infection Criteria Present:  None


New/Unexplained  Altered Menta:  No


Sepsis Screen:  No Definite Risk


SIRS


Temperature: 


Pulse: 93 


Respiratory Rate: 18


 


Laboratory Tests


20 19:35: White Blood Count 15.8H


Blood Pressure 134 /102 


Mean: 113


 


Laboratory Tests


20 19:35: 


Creatinine 1.24, Platelet Count 315, Total Bilirubin 0.5








Results/Orders


Lab Results





Laboratory Tests








Test


 20


19:30 20


19:35 20


19:40 Range/Units


 


 


Urine Color YELLOW     


 


Urine Clarity CLEAR     


 


Urine pH 7.0    5-9  


 


Urine Specific Gravity 1.020    1.016-1.022  


 


Urine Protein 2+ H   NEGATIVE  


 


Urine Glucose (UA) NEGATIVE    NEGATIVE  


 


Urine Ketones NEGATIVE    NEGATIVE  


 


Urine Nitrite NEGATIVE    NEGATIVE  


 


Urine Bilirubin NEGATIVE    NEGATIVE  


 


Urine Urobilinogen 0.2    < = 1.0  MG/DL


 


Urine Leukocyte Esterase NEGATIVE    NEGATIVE  


 


Urine RBC (Auto) NEGATIVE    NEGATIVE  


 


Urine RBC NONE     /HPF


 


Urine WBC 5-10 H    /HPF


 


Urine Crystals NONE     /LPF


 


Urine Bacteria TRACE     /HPF


 


Urine Casts PRESENT     /LPF


 


Urine Hyaline Casts 10-25 H    /LPF


 


Urine Mucus NEGATIVE     /LPF


 


Urine Culture Indicated YES     


 


Urine Opiates Screen NEGATIVE    NEGATIVE  


 


Urine Oxycodone Screen NEGATIVE    NEGATIVE  


 


Urine Methadone Screen NEGATIVE    NEGATIVE  


 


Urine Propoxyphene Screen NEGATIVE    NEGATIVE  


 


Urine Barbiturates Screen NEGATIVE    NEGATIVE  


 


Ur Tricyclic Antidepressants


Screen NEGATIVE 


 


 


 NEGATIVE  





 


Urine Phencyclidine Screen NEGATIVE    NEGATIVE  


 


Urine Amphetamines Screen POSITIVE H   NEGATIVE  


 


Urine Methamphetamines Screen POSITIVE H   NEGATIVE  


 


Urine Benzodiazepines Screen NEGATIVE    NEGATIVE  


 


Urine Cocaine Screen NEGATIVE    NEGATIVE  


 


Urine Cannabinoids Screen POSITIVE H   NEGATIVE  


 


White Blood Count


 


 15.8 H


 


 4.3-11.0


10^3/uL


 


Red Blood Count


 


 4.61 


 


 4.35-5.85


10^6/uL


 


Hemoglobin  14.5   13.3-17.7  G/DL


 


Hematocrit  43   40-54  %


 


Mean Corpuscular Volume  93   80-99  FL


 


Mean Corpuscular Hemoglobin  32   25-34  PG


 


Mean Corpuscular Hemoglobin


Concent 


 34 


 


 32-36  G/DL





 


Red Cell Distribution Width  14.3   10.0-14.5  %


 


Platelet Count


 


 315 


 


 130-400


10^3/uL


 


Mean Platelet Volume  10.3   7.4-10.4  FL


 


Neutrophils (%) (Auto)  71   42-75  %


 


Lymphocytes (%) (Auto)  19   12-44  %


 


Monocytes (%) (Auto)  9   0-12  %


 


Eosinophils (%) (Auto)  1   0-10  %


 


Basophils (%) (Auto)  0   0-10  %


 


Neutrophils # (Auto)  11.2 H  1.8-7.8  X 10^3


 


Lymphocytes # (Auto)  3.0   1.0-4.0  X 10^3


 


Monocytes # (Auto)  1.5 H  0.0-1.0  X 10^3


 


Eosinophils # (Auto)


 


 0.1 


 


 0.0-0.3


10^3/uL


 


Basophils # (Auto)


 


 0.0 


 


 0.0-0.1


10^3/uL


 


Neutrophils % (Manual)  71    %


 


Lymphocytes % (Manual)  20    %


 


Monocytes % (Manual)  6    %


 


Eosinophils % (Manual)  0    %


 


Basophils % (Manual)  0    %


 


Band Neutrophils  3    %


 


Blood Morphology Comment  NORMAL    


 


Sodium Level  140   135-145  MMOL/L


 


Potassium Level  4.1   3.6-5.0  MMOL/L


 


Chloride Level  103     MMOL/L


 


Carbon Dioxide Level  25   21-32  MMOL/L


 


Anion Gap  12   5-14  MMOL/L


 


Blood Urea Nitrogen  13   7-18  MG/DL


 


Creatinine


 


 1.24 


 


 0.60-1.30


MG/DL


 


Estimat Glomerular Filtration


Rate 


 > 60 


 


  





 


BUN/Creatinine Ratio  10    


 


Glucose Level  106 H    MG/DL


 


Calcium Level  11.4 H  8.5-10.1  MG/DL


 


Corrected Calcium     8.5-10.1  MG/DL


 


Magnesium Level  2.4   1.6-2.4  MG/DL


 


Total Bilirubin  0.5   0.1-1.0  MG/DL


 


Aspartate Amino Transf


(AST/SGOT) 


 31 


 


 5-34  U/L





 


Alanine Aminotransferase


(ALT/SGPT) 


 24 


 


 0-55  U/L





 


Alkaline Phosphatase  59     U/L


 


Lactate Dehydrogenase  216   125-220  U/L


 


Total Creatine Kinase  231 H    U/L


 


C-Reactive Protein High


Sensitivity 


 0.04 


 


 0.00-0.50


MG/DL


 


Total Protein  7.9   6.4-8.2  GM/DL


 


Albumin  4.9 H  3.2-4.5  GM/DL


 


Serum Alcohol  < 10   <10  MG/DL








My Orders





Orders - HARDEEP CHUNG MD


Alcohol (20 19:27)


Cbc With Automated Diff (20 19:27)


Comprehensive Metabolic Panel (20 19:27)


Hs C Reactive Protein (20 19:27)


Drug Screen Stat (Urine) (20 19:27)


Magnesium (20 19:27)


Ua Culture If Indicated (8/30/20 19:27)


Ed Iv/Invasive Line Start (20 19:27)


Coronavirus Sars-Cov-2 So 2019 (20 19:27)


LDH (20 19:27)


Lactated Ringers (Lr 1000 Ml Iv Solution (20 19:27)


Creatine Kinase (20 19:39)


Manual Differential (20 19:35)


Urine Culture (20 19:30)


Chest 1 View, Ap/Pa Only (20 20:09)


Lactated Ringers (Lr 1000 Ml Iv Solution (20 20:09)


Chlamydia Trachomatis Urine (20 21:20)


Neis Ayad Dna Urine Test (20 21:20)


Lithium Level (20 21:23)


Cephalexin Capsule (Keflex Capsule) (20 21:30)





Medications Given in ED





Current Medications








 Medications  Dose


 Ordered  Sig/Daniele


 Route  Start Time


 Stop Time Status Last Admin


Dose Admin


 


 Cephalexin HCl  500 mg  ONCE  ONCE


 PO  20 21:30


 20 21:31 DC 20 21:35


500 MG


 


 Lactated Ringer's  1,000 ml @ 


 0 mls/hr  Q0M ONCE


 IV  20 19:27


 20 19:36 DC 20 19:41


1,000 MLS/HR


 


 Lactated Ringer's  1,000 ml @ 


 0 mls/hr  Q0M ONCE


 IV  20 20:09


 20 20:10 DC 20 20:21


1,000 MLS/HR








Vital Signs/I&O











 20





 19:14 20:44 21:51


 


Temp 37.0  


 


Pulse 117 93 86


 


Resp 18 18 18


 


B/P (MAP) 141/91 (108) 134/102 (113) 154/85


 


Pulse Ox  100 100


 


O2 Delivery Room Air Room Air Room Air














 20





 00:00


 


Intake Total 2000 ml


 


Balance 2000 ml





Capillary Refill : Less Than 3 Seconds








Blood Pressure Mean:                    113








Progress Note :  


Progress Note


patient received 2 L of IV fluids.  COVID-19 swab was obtained.  Labs were 

reviewed.  Keflex was initiated for possible urinary tract infection.  Urine GC 

and chlamydia tests were ordered as well.  See discharge instructions for 

further discussion.





Diagnostic Imaging





   Diagonstic Imaging:  Xray


   Plain Films/CT/US/NM/MRI:  chest


Comments


chest x-ray viewed by me and report reviewed.  See report below:





NAME:   CARTER LANDIN II


MED REC#:   [id number]


ACCOUNT#:   [id number]


PT STATUS:   REG ER


:   1980


PHYSICIAN:   HARDEEP CHUNG MD


ADMIT DATE:   20/ER


***Signed***


Date of Exam:20





CHEST 1 VIEW, AP/PA ONLY





INDICATION: Cough, shortness of breath, malaise. 





EXAMINATION: Single view of the chest was obtained.





FINDINGS: The lungs are clear. The heart size and vascularity are


normal. No failure, effusion or pneumothorax. 





IMPRESSION: No acute appearing abnormality. 





Dictated by: 





  Dictated on workstation # [id number]





Dict:   20


Trans:   20


[facility] [id number]





Interpreted by:     STACEY BARRERA


Electronically signed by: STACEY BARRERA 20





Departure


Impression





   Primary Impression:  


   Flu-like symptoms


   Additional Impressions:  


   Positive urine drug screen


   Person under investigation for COVID-19


   Urinary tract infection


   Qualified Codes:  N39.0 - Urinary tract infection, site not specified


Disposition:  01 HOME, SELF-CARE


Condition:  Improved





Departure-Patient Inst.


Decision time for Depature:  21:17


Referrals:  


[facility] (PCP/Family)


Primary Care Physician


Patient Instructions:  Coronavirus Disease 2019 (COVID-19) (DC)





Add. Discharge Instructions:  


Follow-up with your primary care provider later this week with a phone call.  

You need to review your urine cultures with your primary care provider in a few 

days.  Abstain from intercourse until you discuss results with your doctor.


A lithium level was also ordered and should be reviewed with your doctor.


You have been tested for COVID-19.  Results should return in 24-48 hours.  Until

then you need to quarantine at home with no contact with other people.


If your COVID-19 test is negative, you may return to usual activities when 

symptoms have been resolved for 3 days (72 hours).  If COVID-19 test is 

positive, you must quarantine for 14 days.


Call or return to the emergency room if you have worsening symptoms.


If you have persistent nausea or vomiting, use the Zofran as prescribed.


Complete your antibiotics as prescribed.





All discharge instructions reviewed with patient and/or family. Voiced 

understanding.


Scripts


Cephalexin (Keflex) 500 Mg Capsule


500 MG PO TID, #20 CAP


   Prov: HARDEEP CHUNG MD         20 


Ondansetron (Ondansetron Odt) 4 Mg Tab.rapdis


4 MG SL Q4H PRN for NAUSEA/VOMITING, #10 TAB


   Prov: HARDEEP CHUNG MD         20


Work/School Note:  Work Release Form   Date Seen in the Emergency Department:  

Aug 30, 2020


   Return to Work:  Sep 3, 2020


      Other Restrictions Listed Below:  If COVID-19 test negative, may return to

work 3 days after symptoms resolve


   Restrictions:  If COVID 19 positive, must quarantine for 14 days.





Copy


Copies To 1:   DONTA MCKINNEY JOSHUA T MD        Aug 30, 2020 21:05

## 2020-08-30 NOTE — DIAGNOSTIC IMAGING REPORT
INDICATION: Cough, shortness of breath, malaise. 



EXAMINATION: Single view of the chest was obtained.



FINDINGS: The lungs are clear. The heart size and vascularity are

normal. No failure, effusion or pneumothorax. 



IMPRESSION: No acute appearing abnormality. 



Dictated by: 



  Dictated on workstation # [id number]

## 2020-08-30 NOTE — NUR
Pt arrives with c/o sob x 4-5 days; pt also c/o recent HA, blisters on his 
feet, diarrhea, and general melaise. Pt reports that some of these complaints 
have resolved but the melaise and sob have continued as well as intermittent 
cough. Pt denies known exposure to Covid. 

Pt to isolation room; pt educated to need for isolation. ERP to room for eval. 
IV and labs drawn. Pt to monitor. VSS. Will continue to monitor.

## 2020-08-30 NOTE — NUR
Pt resting; reports he feels a little better; pt watching TV and talking on 
phone. 2nd liter of fluid infusing.

## 2020-09-07 ENCOUNTER — HOSPITAL ENCOUNTER (EMERGENCY)
Dept: HOSPITAL 75 - ER | Age: 40
Discharge: HOME | End: 2020-09-07
Payer: SELF-PAY

## 2020-09-07 VITALS — DIASTOLIC BLOOD PRESSURE: 71 MMHG | SYSTOLIC BLOOD PRESSURE: 113 MMHG

## 2020-09-07 VITALS — WEIGHT: 174.17 LBS | BODY MASS INDEX: 23.85 KG/M2 | HEIGHT: 71.65 IN

## 2020-09-07 DIAGNOSIS — Z20.828: ICD-10-CM
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
## 2020-09-07 DIAGNOSIS — F31.9: ICD-10-CM

## 2020-09-07 DIAGNOSIS — T43.595A: ICD-10-CM
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
## 2020-09-07 DIAGNOSIS — S06.0X0A: Primary | ICD-10-CM

## 2020-09-07 DIAGNOSIS — Z88.8: ICD-10-CM

## 2020-09-07 DIAGNOSIS — F17.210: ICD-10-CM

## 2020-09-07 DIAGNOSIS — S09.8XXA: ICD-10-CM

## 2020-09-07 DIAGNOSIS — R40.2410: ICD-10-CM

## 2020-09-07 DIAGNOSIS — L27.0: ICD-10-CM

## 2020-09-07 PROCEDURE — 72125 CT NECK SPINE W/O DYE: CPT

## 2020-09-07 PROCEDURE — 70450 CT HEAD/BRAIN W/O DYE: CPT

## 2020-09-07 NOTE — DIAGNOSTIC IMAGING REPORT
PROCEDURE: CT head and CT cervical spine without contrast.



TECHNIQUE: Multiple contiguous axial images were obtained through

the brain and cervical spine without the use of intravenous

contrast. Sagittal and coronal reformations through the cervical

spine were then performed. Auto Exposure Controls were utilized

during the CT exam to meet ALARA standards for radiation dose

reduction. 



INDICATION:  Hit right forehead on counter yesterday. Headache. 



COMPARISON: CT head and cervical spine without contrast

06/11/2016.



FINDINGS: 



CT HEAD: No intracranial hemorrhage, mass effect, hydrocephalus

or extra-axial fluid collections. No CT evidence of a territorial

infarction. Osseous structures are intact. The paranasal sinuses

and mastoids are clear.



CT CERVICAL SPINE: Normal alignment. Vertebral body heights

preserved. No fractures. Lung apices are clear. The visualized

paravertebral soft tissues are unremarkable.



IMPRESSION: No acute intracranial or cervical spine CT findings.



 



Dictated by: 



  Dictated on workstation # [id number]
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
## 2020-10-04 ENCOUNTER — HOSPITAL ENCOUNTER (EMERGENCY)
Dept: HOSPITAL 75 - ER | Age: 40
Discharge: HOME | End: 2020-10-04
Payer: COMMERCIAL

## 2020-10-04 VITALS — HEIGHT: 71.65 IN | BODY MASS INDEX: 24.42 KG/M2 | WEIGHT: 178.35 LBS

## 2020-10-04 VITALS — SYSTOLIC BLOOD PRESSURE: 121 MMHG | DIASTOLIC BLOOD PRESSURE: 99 MMHG

## 2020-10-04 DIAGNOSIS — Z88.8: ICD-10-CM

## 2020-10-04 DIAGNOSIS — F17.210: ICD-10-CM

## 2020-10-04 DIAGNOSIS — F31.9: ICD-10-CM
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
## 2020-10-04 DIAGNOSIS — S06.0X0A: Primary | ICD-10-CM

## 2020-10-04 DIAGNOSIS — S70.211A: ICD-10-CM

## 2020-10-04 DIAGNOSIS — S80.211A: ICD-10-CM

## 2020-10-04 DIAGNOSIS — S80.812A: ICD-10-CM

## 2020-10-04 DIAGNOSIS — R40.2410: ICD-10-CM

## 2020-10-04 DIAGNOSIS — S20.412A: ICD-10-CM

## 2020-10-04 LAB
ALBUMIN SERPL-MCNC: 4.3 GM/DL (ref 3.2–4.5)
ALP SERPL-CCNC: 55 U/L (ref 40–136)
ALT SERPL-CCNC: 22 U/L (ref 0–55)
BILIRUB DIRECT SERPL-MCNC: 0.2 MG/DL (ref 0–0.3)
BILIRUB SERPL-MCNC: 0.5 MG/DL (ref 0.1–1)
BUN/CREAT SERPL: 15
CALCIUM SERPL-MCNC: 9.3 MG/DL (ref 8.5–10.1)
CHLORIDE SERPL-SCNC: 102 MMOL/L (ref 98–107)
CO2 SERPL-SCNC: 22 MMOL/L (ref 21–32)
CREAT SERPL-MCNC: 0.97 MG/DL (ref 0.6–1.3)
GFR SERPLBLD BASED ON 1.73 SQ M-ARVRAT: > 60 ML/MIN
GLUCOSE SERPL-MCNC: 100 MG/DL (ref 70–105)
HCT VFR BLD CALC: 39 % (ref 40–54)
HGB BLD-MCNC: 13 G/DL (ref 13.3–17.7)
MCH RBC QN AUTO: 32 PG (ref 25–34)
MCHC RBC AUTO-ENTMCNC: 34 G/DL (ref 32–36)
MCV RBC AUTO: 96 FL (ref 80–99)
PLATELET # BLD: 332 10^3/UL (ref 130–400)
PMV BLD AUTO: 10.1 FL (ref 9–12.2)
POTASSIUM SERPL-SCNC: 3.7 MMOL/L (ref 3.6–5)
PROT SERPL-MCNC: 6.9 GM/DL (ref 6.4–8.2)
SODIUM SERPL-SCNC: 134 MMOL/L (ref 135–145)
WBC # BLD AUTO: 13.6 10^3/UL (ref 4.3–11)

## 2020-10-04 PROCEDURE — 71045 X-RAY EXAM CHEST 1 VIEW: CPT

## 2020-10-04 PROCEDURE — 36415 COLL VENOUS BLD VENIPUNCTURE: CPT

## 2020-10-04 PROCEDURE — 80048 BASIC METABOLIC PNL TOTAL CA: CPT

## 2020-10-04 PROCEDURE — 72125 CT NECK SPINE W/O DYE: CPT

## 2020-10-04 PROCEDURE — 85027 COMPLETE CBC AUTOMATED: CPT

## 2020-10-04 PROCEDURE — 70450 CT HEAD/BRAIN W/O DYE: CPT

## 2020-10-04 PROCEDURE — 73590 X-RAY EXAM OF LOWER LEG: CPT

## 2020-10-04 PROCEDURE — 73552 X-RAY EXAM OF FEMUR 2/>: CPT

## 2020-10-04 PROCEDURE — 80076 HEPATIC FUNCTION PANEL: CPT

## 2020-10-04 PROCEDURE — 74177 CT ABD & PELVIS W/CONTRAST: CPT

## 2020-10-04 PROCEDURE — 99284 EMERGENCY DEPT VISIT MOD MDM: CPT

## 2020-10-04 PROCEDURE — 71260 CT THORAX DX C+: CPT

## 2020-10-04 PROCEDURE — 80320 DRUG SCREEN QUANTALCOHOLS: CPT

## 2020-10-04 PROCEDURE — 72170 X-RAY EXAM OF PELVIS: CPT
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
## 2020-11-03 ENCOUNTER — HOSPITAL ENCOUNTER (OUTPATIENT)
Dept: HOSPITAL 75 - RAD | Age: 40
End: 2020-11-03
Attending: INTERNAL MEDICINE
Payer: COMMERCIAL

## 2020-11-03 DIAGNOSIS — S83.231A: Primary | ICD-10-CM
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
## 2020-11-03 PROCEDURE — 73721 MRI JNT OF LWR EXTRE W/O DYE: CPT

## 2020-11-03 NOTE — DIAGNOSTIC IMAGING REPORT
MRI RT LOWER EXT JOINT W/O



TECHNIQUE: Multiplanar, multisequence MR imaging of the right

knee was performed without contrast.



COMPARISON: Right tibia and fibula radiographs from 10/4/2020 



INDICATION: Right knee pain.



FINDINGS:



MENISCI 

Medial meniscus: There is a horizontal cleavage tear in the

posterior horn extending from the free edge of the peripheral one

third. No associated parameniscal cyst. Mild truncation in the

free edge of the body is also present, and there is a potential

fragment of the free edge flipped superiorly upon the peripheral

body and extending into the medial gutter. The body of the medial

meniscus is extruded approximately 3 mm into the medial gutter.

Lateral meniscus: Normal.



LIGAMENTS

ACL: Intact.

PCL: Intact.

MCL: Intact.

LCL: The lateral collateral ligamentous complex is intact.



EXTENSOR MECHANISM

The extensor mechanism is intact.



CARTILAGE

Medial compartment: Focal high-grade partial versus

full-thickness chondral loss in the periphery of the central

weightbearing aspect of the medial tibial plateau. There is some

underlying subchondral bone marrow edema at this site.

Lateral compartment: The lateral compartment articular cartilage

is preserved without high-grade chondromalacia.

Patellofemoral compartment: Multifocal linear high-grade partial

and full-thickness chondral fissuring throughout the patella.

Trochlear cartilage is preserved.



BONE

No fracture, stress fracture or osteonecrosis. 



SOFT TISSUE

No knee effusion or Baker's cyst.



IMPRESSION: 

1. Complex tear body and posterior horn of the medial meniscus

potentially has a free edge fragment in the body flipped

superiorly into the region of the medial gutter.

2. A few foci of full-thickness and high-grade partial articular

cartilage loss are present in the medial and patellofemoral

compartments.



 



Dictated by: 



  Dictated on workstation # [id number]

## 2020-12-27 ENCOUNTER — HOSPITAL ENCOUNTER (EMERGENCY)
Dept: HOSPITAL 75 - ER | Age: 40
Discharge: HOME | End: 2020-12-27
Payer: SELF-PAY

## 2020-12-27 VITALS — BODY MASS INDEX: 30.1 KG/M2 | HEIGHT: 66.93 IN | WEIGHT: 191.8 LBS

## 2020-12-27 VITALS — SYSTOLIC BLOOD PRESSURE: 112 MMHG | DIASTOLIC BLOOD PRESSURE: 81 MMHG

## 2020-12-27 DIAGNOSIS — Z20.828: ICD-10-CM

## 2020-12-27 DIAGNOSIS — F17.210: ICD-10-CM

## 2020-12-27 DIAGNOSIS — F41.9: ICD-10-CM

## 2020-12-27 DIAGNOSIS — R07.89: Primary | ICD-10-CM

## 2020-12-27 DIAGNOSIS — Z88.8: ICD-10-CM

## 2020-12-27 DIAGNOSIS — F31.9: ICD-10-CM

## 2020-12-27 LAB
BASOPHILS # BLD AUTO: 0.1 10^3/UL (ref 0–0.1)
BASOPHILS NFR BLD AUTO: 1 % (ref 0–10)
BUN/CREAT SERPL: 20
CALCIUM SERPL-MCNC: 9.6 MG/DL (ref 8.5–10.1)
CHLORIDE SERPL-SCNC: 105 MMOL/L (ref 98–107)
CO2 SERPL-SCNC: 22 MMOL/L (ref 21–32)
CREAT SERPL-MCNC: 0.8 MG/DL (ref 0.6–1.3)
EOSINOPHIL # BLD AUTO: 0.4 10^3/UL (ref 0–0.3)
EOSINOPHIL NFR BLD AUTO: 4 % (ref 0–10)
GFR SERPLBLD BASED ON 1.73 SQ M-ARVRAT: > 60 ML/MIN
GLUCOSE SERPL-MCNC: 113 MG/DL (ref 70–105)
HCT VFR BLD CALC: 48 % (ref 40–54)
HGB BLD-MCNC: 15 G/DL (ref 13.3–17.7)
LYMPHOCYTES # BLD AUTO: 2.1 10^3/UL (ref 1–4)
LYMPHOCYTES NFR BLD AUTO: 21 % (ref 12–44)
MANUAL DIFFERENTIAL PERFORMED BLD QL: NO
MCH RBC QN AUTO: 31 PG (ref 25–34)
MCHC RBC AUTO-ENTMCNC: 32 G/DL (ref 32–36)
MCV RBC AUTO: 99 FL (ref 80–99)
MONOCYTES # BLD AUTO: 0.6 10^3/UL (ref 0–1)
MONOCYTES NFR BLD AUTO: 6 % (ref 0–12)
NEUTROPHILS # BLD AUTO: 7 10^3/UL (ref 1.8–7.8)
NEUTROPHILS NFR BLD AUTO: 68 % (ref 42–75)
PLATELET # BLD: 386 10^3/UL (ref 130–400)
PMV BLD AUTO: 9.8 FL (ref 9–12.2)
POTASSIUM SERPL-SCNC: 4.8 MMOL/L (ref 3.6–5)
SODIUM SERPL-SCNC: 135 MMOL/L (ref 135–145)
WBC # BLD AUTO: 10.2 10^3/UL (ref 4.3–11)

## 2020-12-27 PROCEDURE — 71045 X-RAY EXAM CHEST 1 VIEW: CPT

## 2020-12-27 PROCEDURE — 85379 FIBRIN DEGRADATION QUANT: CPT

## 2020-12-27 PROCEDURE — 99283 EMERGENCY DEPT VISIT LOW MDM: CPT

## 2020-12-27 PROCEDURE — 85025 COMPLETE CBC W/AUTO DIFF WBC: CPT

## 2020-12-27 PROCEDURE — 80048 BASIC METABOLIC PNL TOTAL CA: CPT

## 2020-12-27 PROCEDURE — 36415 COLL VENOUS BLD VENIPUNCTURE: CPT

## 2020-12-27 PROCEDURE — 87635 SARS-COV-2 COVID-19 AMP PRB: CPT

## 2020-12-27 PROCEDURE — 83880 ASSAY OF NATRIURETIC PEPTIDE: CPT

## 2020-12-27 NOTE — ED GENERAL
General


Chief Complaint:  Rib Pain


Stated Complaint:  FALL/RIB PAIN/SOA


Nursing Triage Note:  


ARRIVED VIA AMB WITH COMPLAINTS OR INCREASED SOA.  STATES HE FELL X3 DAYS AGO 


AND HIS KNEE HIT THE LEFT SIDE OF HIS CHEST AND RIBS CAUSING PAIN.  STATESHE WAS




SLIGHTLY SOA BEFORE HE FELL.


Nursing Sepsis Screen:  No Definite Risk


Source of Information:  Patient


Exam Limitations:  No Limitations





History of Present Illness


Date Seen by Provider:  Dec 27, 2020


Time Seen by Provider:  12:00


Initial Comments


To ER with reports of left anterolateral lower chest wall pain after he fell 

when his knee buckled the other day.  His knee struck him in the left lower 

chest.  He does have some shortness of breath and had a little bit of shortness 

of breath before this injury.  No fevers or chills.  Chronic cough from smoking 

no different.


Timing/Duration:  3-4 Days


Severity:  Moderate


Associated Systoms:  Chest Pain, Cough





Allergies and Home Medications


Allergies


Coded Allergies:  


     prednisone (Verified  Allergy, Unknown, 2/3/10)





Home Medications


Amoxicillin 500 Mg Capsule, 500 MG PO TID


   Prescribed by: BALDEMAR BREWSTER on 4/13/20 1422


Cephalexin 500 Mg Capsule, 500 MG PO TID


   Prescribed by: HARDEEP ROGERS on 8/30/20 2122


Cyclobenzaprine HCl 10 Mg Tablet, 10 MG PO Q8H PRN for SPASMS


   Prescribed by: ELISE BRIGHT on 10/4/20 1942


Ibuprofen 600 Mg Tablet, 600 MG PO Q6H PRN for PAIN


   Prescribed by: JESSICA GIANG on 2/18/17 1521


Lisdexamfetamine Dimesylate 40 Mg Capsule, 40 MG PO DAILY, (Reported)


Lithium Carbonate 450 Mg Tablet.sa, 600 MG PO BID, (Reported)


Loratadine 10 Mg Tablet, 10 MG PO DAILY PRN PRN for ITCHING


   Prescribed by: ELISE BRIGHT on 9/7/20 1829


Mirtazapine 30 Mg Tablet, 45 MG PO HS, (Reported)


Ondansetron 4 Mg Tab.rapdis, 4 MG SL Q4H PRN for NAUSEA/VOMITING


   Prescribed by: HARDEEP ROGERS on 8/30/20 2122


Ondansetron 4 Mg Tab.rapdis, 4 MG PO Q6H PRN for NAUSEA/VOMITING


   Prescribed by: ELISE BRIGHT on 9/7/20 1829


Quetiapine Fumarate 300 Mg Tab.sr.24h, 300 MG PO HS, (Reported)


Tramadol HCl 50 Mg Tablet, 50 MG PO Q6H PRN for PAIN


   Prescribed by: ELISE BRIGHT on 10/4/20 1940





Patient Home Medication List


Home Medication List Reviewed:  Yes





Review of Systems


Review of Systems


Constitutional:  see HPI; No chills, No diaphoresis


EENTM:  see HPI


Respiratory:  no symptoms reported, see HPI, cough, short of breath


Cardiovascular:  see HPI, chest pain


Genitourinary:  no symptoms reported


Musculoskeletal:  no symptoms reported


Skin:  no symptoms reported


Psychiatric/Neurological:  No Symptoms Reported





Past Medical-Social-Family Hx


Patient Social History


Alcohol Use:  Denies Use


Recreational Drug Use:  Yes (POT, METH)


Drug of Choice:  history of IV drug use, ACID


Smoking Status:  Current Everyday Smoker


Type Used:  Cigarettes


2nd Hand Smoke Exposure:  Yes


Recent Foreign Travel:  No


Contact w/Someone Who Travel:  No


Recent Infectious Disease Expo:  No


Recent Hopitalizations:  No





Immunizations Up To Date


Tetanus Booster (TDap):  Unknown





Seasonal Allergies


Seasonal Allergies:  No





Past Medical History


Surgeries:  Yes (LT KNEE AND QUAD, RT SHOULDER, RT KNEE, LT WRIST, JAW 

RECONSTRUCTION)


Orthopedic


Respiratory:  No


Cardiac:  Yes


Endocarditis


Neurological:  No


Reproductive Disorders:  No


Gastrointestinal:  No


Musculoskeletal:  Yes


Fractures


Endocrine:  No


HEENT:  No


Cancer:  No


Psychosocial:  Yes


ADD/ADHD, Anxiety, Bipolar, Depression


Integumentary:  No


Blood Disorders:  No





Family Medical History





Patient reports no known family medical history.


No Pertinent Family Hx





Physical Exam


Vital Signs





Vital Signs - First Documented








 12/27/20





 11:40


 


Temp 37.0


 


Pulse 77


 


B/P (MAP) 112/81 (91)





Capillary Refill : Less Than 3 Seconds


Height, Weight, BMI


Height: 6'1"


Weight: 188lbs. 6.4oz. [id number]; 30.00 BMI


Method:Stated


General Appearance:  No Apparent Distress, WD/WN


Eyes:  Bilateral Eye Normal Inspection, Bilateral Eye PERRL, Bilateral Eye EOMI


Respiratory:  No Accessory Muscle Use, No Respiratory Distress


Cardiovascular:  Regular Rate, Rhythm, Normal Peripheral Pulses


Gastrointestinal:  Normal Bowel Sounds, Non Tender, Soft


Extremity:  Normal Capillary Refill, Normal Inspection, Other (He has a few 

pustules on the right lateral lower leg that he is concerned about.  No abscess 

no cellulitis.)


Neurologic/Psychiatric:  Alert, Oriented x3


Skin:  Normal Color, Warm/Dry





Procedures/Interventions





   Suture Size:  4-0





Progress/Results/Core Measures


Suspected Sepsis


Recent Fever Within 48 Hours:  No


Infection Criteria Present:  None


New/Unexplained  Altered Menta:  No


Sepsis Screen:  No Definite Risk


SIRS


Temperature: 


Pulse: 77 


Respiratory Rate: 


 


Laboratory Tests


12/27/20 12:15: White Blood Count 10.2


Blood Pressure 112 /81 


Mean: 91


 


Laboratory Tests


12/27/20 12:15: 


Creatinine 0.80, Platelet Count 386








Results/Orders


Lab Results





Laboratory Tests








Test


 12/27/20


12:15 Range/Units


 


 


White Blood Count


 10.2 


 4.3-11.0


10^3/uL


 


Red Blood Count


 4.81 


 4.30-5.52


10^6/uL


 


Hemoglobin 15.0  13.3-17.7  g/dL


 


Hematocrit 48  40-54  %


 


Mean Corpuscular Volume 99  80-99  fL


 


Mean Corpuscular Hemoglobin 31  25-34  pg


 


Mean Corpuscular Hemoglobin


Concent 32 


 32-36  g/dL





 


Red Cell Distribution Width 13.1  10.0-14.5  %


 


Platelet Count


 386 


 130-400


10^3/uL


 


Mean Platelet Volume 9.8  9.0-12.2  fL


 


Immature Granulocyte % (Auto) 0   %


 


Neutrophils (%) (Auto) 68  42-75  %


 


Lymphocytes (%) (Auto) 21  12-44  %


 


Monocytes (%) (Auto) 6  0-12  %


 


Eosinophils (%) (Auto) 4  0-10  %


 


Basophils (%) (Auto) 1  0-10  %


 


Neutrophils # (Auto)


 7.0 


 1.8-7.8


10^3/uL


 


Lymphocytes # (Auto)


 2.1 


 1.0-4.0


10^3/uL


 


Monocytes # (Auto)


 0.6 


 0.0-1.0


10^3/uL


 


Eosinophils # (Auto)


 0.4 H


 0.0-0.3


10^3/uL


 


Basophils # (Auto)


 0.1 


 0.0-0.1


10^3/uL


 


Immature Granulocyte # (Auto)


 0.0 


 0.0-0.1


10^3/uL


 


D-Dimer


 < 0.27 


 0.00-0.49


UG/ML


 


Sodium Level 135  135-145  MMOL/L


 


Potassium Level 4.8  3.6-5.0  MMOL/L


 


Chloride Level 105    MMOL/L


 


Carbon Dioxide Level 22  21-32  MMOL/L


 


Anion Gap 8  5-14  MMOL/L


 


Blood Urea Nitrogen 16  7-18  MG/DL


 


Creatinine


 0.80 


 0.60-1.30


MG/DL


 


Estimat Glomerular Filtration


Rate > 60 


  





 


BUN/Creatinine Ratio 20   


 


Glucose Level 113 H   MG/DL


 


Calcium Level 9.6  8.5-10.1  MG/DL


 


B-Type Natriuretic Peptide 16.1  <100.0  PG/ML


 


Coronavirus 2019 ([facility]) Negative  Negative  








My Orders





Orders - ANNY HYLTON


Covid 19 Inhouse Test (12/27/20 11:54)


Cbc With Automated Diff (12/27/20 11:54)


Basic Metabolic Panel (12/27/20 11:54)


BNP (12/27/20 11:54)


Fibrin Degradation Products (12/27/20 11:54)


Ed Iv/Invasive Line Start (12/27/20 11:54)


Chest 1 View, Ap/Pa Only (12/27/20 11:54)


Hydrocodone/Apap 5/325 Tablet (Lortab 5 (12/27/20 13:00)





Medications Given in ED





Current Medications








 Medications  Dose


 Ordered  Sig/Daniele


 Route  Start Time


 Stop Time Status Last Admin


Dose Admin


 


 Acetaminophen/


 Hydrocodone Bitart  1 tab  ONCE  ONCE


 PO  12/27/20 13:00


 12/27/20 13:01 DC 12/27/20 13:05


1 TAB








Vital Signs/I&O











 12/27/20





 11:40


 


Temp 37.0


 


Pulse 77


 


B/P (MAP) 112/81 (91)





Capillary Refill : Less Than 3 Seconds








Blood Pressure Mean:                    91











Departure


Impression





   Primary Impression:  


   Chest wall pain


Disposition:  01 HOME, SELF-CARE


Condition:  Stable





Departure-Patient Inst.


Decision time for Depature:  13:14


Referrals:  


[facility] (PCP/Family)


Primary Care Physician


Patient Instructions:  Chest Pain, Adult ED





Add. Discharge Instructions:  


1.  Return to ER for any concerns


2.  Follow-up with your doctor next week


3.





All discharge instructions reviewed with patient and/or family. Voiced 

understanding.


Scripts


Hydrocodone/Acetaminophen (Hydrocodone-Acetamin 5-325 mg) 1 Each Tablet


1 EACH PO Q6H PRN for PAIN-MODERATE (5-7), #5 TAB


   Prov: ANNY HYLTON         12/27/20











ANNY HYLTON             Dec 27, 2020 12:39

## 2020-12-27 NOTE — DIAGNOSTIC IMAGING REPORT
EXAM: CHEST 1 VIEW, AP/PA ONLY



INDICATION: Shortness of air. Fall 3 days ago.



COMPARISON: CT chest 10/04/2020.



FINDINGS: Normal heart size and central pulmonary vascularity. No

focal pulmonary opacity, pleural effusion or pneumothorax. No

acute osseous findings.



IMPRESSION: No acute cardiopulmonary findings.



Dictated by: 



  Dictated on workstation # [id number]

## 2021-01-26 ENCOUNTER — HOSPITAL ENCOUNTER (EMERGENCY)
Dept: HOSPITAL 75 - ER | Age: 41
Discharge: HOME | End: 2021-01-26
Payer: SELF-PAY

## 2021-01-26 VITALS — HEIGHT: 72.99 IN | WEIGHT: 191.8 LBS | BODY MASS INDEX: 25.42 KG/M2

## 2021-01-26 VITALS — DIASTOLIC BLOOD PRESSURE: 78 MMHG | SYSTOLIC BLOOD PRESSURE: 124 MMHG

## 2021-01-26 DIAGNOSIS — F31.9: ICD-10-CM
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
## 2021-01-26 DIAGNOSIS — F41.9: ICD-10-CM

## 2021-01-26 DIAGNOSIS — S61.213A: Primary | ICD-10-CM

## 2021-01-26 DIAGNOSIS — F17.210: ICD-10-CM

## 2021-01-26 DIAGNOSIS — Z88.8: ICD-10-CM

## 2021-01-26 PROCEDURE — 12031 INTMD RPR S/A/T/EXT 2.5 CM/<: CPT

## 2021-01-26 PROCEDURE — 64450 NJX AA&/STRD OTHER PN/BRANCH: CPT

## 2021-01-26 PROCEDURE — 73130 X-RAY EXAM OF HAND: CPT

## 2021-01-26 NOTE — ED UPPER EXTREMITY
General


Chief Complaint:  Laceration


Stated Complaint:  L MIDDLE FINGER LAC


Nursing Triage Note:  


Pt reports laceration due to table saw to L middle finger.  Pt reports injury 


occured 1-1.5 hours ago.  Pt reports being up to date on tetanus.  Pt has 


dressing in place and bleeding controlled at this time.


Nursing Sepsis Screen:  No Definite Risk


Source:  patient


Exam Limitations:  no limitations





History of Present Illness


Date Seen by Provider:  Jan 26, 2021


Time Seen by Provider:  16:00


Initial Comments


To ER with tablesaw injury to the distal pad of the middle phalanx left hand 

just prior to arrival.  Tetanus is up-to-date.


Onset:  just prior to arrival


Severity:  moderate


Pain/Injury Location:  left 3rd finger


Method of Injury:  direct blow


Modifying Factors:  Worse With Movement





Allergies and Home Medications


Allergies


Coded Allergies:  


     prednisone (Verified  Allergy, Unknown, 2/3/10)





Home Medications


Amoxicillin 500 Mg Capsule, 500 MG PO TID


   Prescribed by: BALDEMAR BREWSTER on 4/13/20 1422


Cephalexin 500 Mg Capsule, 500 MG PO TID


   Prescribed by: HARDEEP ROGERS on 8/30/20 2122


Cyclobenzaprine HCl 10 Mg Tablet, 10 MG PO Q8H PRN for SPASMS


   Prescribed by: ELISE BRIGHT on 10/4/20 1942


Hydrocodone/Acetaminophen 1 Each Tablet, 1 EACH PO Q6H PRN for PAIN-MODERATE 

(5-7)


   Prescribed by: ANNY HYLTON on 12/27/20 1316


Ibuprofen 600 Mg Tablet, 600 MG PO Q6H PRN for PAIN


   Prescribed by: JESSICA GIANG on 2/18/17 1521


Lisdexamfetamine Dimesylate 40 Mg Capsule, 40 MG PO DAILY, (Reported)


Lithium Carbonate 450 Mg Tablet.sa, 600 MG PO BID, (Reported)


Loratadine 10 Mg Tablet, 10 MG PO DAILY PRN PRN for ITCHING


   Prescribed by: ELISE BRIGHT on 9/7/20 1829


Mirtazapine 30 Mg Tablet, 45 MG PO HS, (Reported)


Ondansetron 4 Mg Tab.rapdis, 4 MG SL Q4H PRN for NAUSEA/VOMITING


   Prescribed by: HARDEEP ROGERS on 8/30/20 2122


Ondansetron 4 Mg Tab.rapdis, 4 MG PO Q6H PRN for NAUSEA/VOMITING


   Prescribed by: ELISE BRIGHT on 9/7/20 1829


Quetiapine Fumarate 300 Mg Tab.sr.24h, 300 MG PO HS, (Reported)


Tramadol HCl 50 Mg Tablet, 50 MG PO Q6H PRN for PAIN


   Prescribed by: ELISE BRIGHT on 10/4/20 1940





Patient Home Medication List


Home Medication List Reviewed:  Yes





Review of Systems


Constitutional:  see HPI


EENTM:  see HPI


Respiratory:  no symptoms reported


Cardiovascular:  no symptoms reported


Genitourinary:  no symptoms reported


Musculoskeletal:  see HPI


Skin:  no symptoms reported


Psychiatric/Neurological:  No Symptoms Reported





Past Medical-Social-Family Hx


Patient Social History


Alcohol Use:  Denies Use


Drug of Choice:  history of IV drug use, ACID


Smoking Status:  Current Everyday Smoker


Type Used:  Cigarettes


2nd Hand Smoke Exposure:  Yes


Recent Infectious Disease Expo:  No


Recent Hopitalizations:  No





Immunizations Up To Date


Tetanus Booster (TDap):  Unknown





Seasonal Allergies


Seasonal Allergies:  No





Past Medical History


Surgeries:  Yes (LT KNEE AND QUAD, RT SHOULDER, RT KNEE, LT WRIST, JAW R

ECONSTRUCTION)


Orthopedic


Respiratory:  No


Cardiac:  Yes


Endocarditis


Neurological:  No


Reproductive Disorders:  No


Gastrointestinal:  No


Musculoskeletal:  Yes


Fractures


Endocrine:  No


HEENT:  No


Cancer:  No


Psychosocial:  Yes


ADD/ADHD, Anxiety, Bipolar, Depression


Integumentary:  No


Blood Disorders:  No





Family Medical History





Patient reports no known family medical history.


No Pertinent Family Hx





Physical Exam


Vital Signs





Vital Signs - First Documented








 1/26/21





 15:55


 


Temp 36.2


 


Pulse 71


 


Resp 20


 


B/P (MAP) 124/78 (93)


 


Pulse Ox 98


 


O2 Delivery Room Air





Capillary Refill : Less Than 3 Seconds


Height, Weight, BMI


Height: 6'1"


Weight: 188lbs. 6.4oz. 85.782574gq; 25.00 BMI


Method:Stated


General Appearance:  WD/WN, no apparent distress


Neck:  non-tender, full range of motion


Respiratory:  no respiratory distress, no accessory muscle use


Shoulder:  normal inspection, non-tender


Elbow/Forearm:  normal inspection, non-tender


Hand:  Left, laceration (There is a 2 cm stellate laceration to the pad of the 

left finger.  There is no nail involvement.  Depth is to the subcutaneous 

tissue.)


Neurologic/Psychiatric:  alert, normal mood/affect, oriented x 3


Skin:  normal color, warm/dry





Procedures/Interventions





   Wound Location:  Upper Extremities


   Wound Length (cm):  2


   Wound's Depth, Shape:  irregular, sub Q


   Wound Explored:  clean


   Irrigated w/ Saline (ccs):  300


   Suture:  Prolene


   Suture Size:  5-0


   Number of Sutures:  5


   Layer Closure?:  1


   Number Deep Layer Sutures:  0


Progress


Did a digital block using 4 mL of 1% lidocaine without epinephrine.  Waited 

about 10 minutes then applied a finger tourniquet and scrubbed the fingertip and

irrigated thoroughly.  This was loosely tacked into place with 5 simple 

interrupted sutures size 5-0 Prolene.  The superficial flap of the pad is 

devitalized but will be used as a dressing.  Then removed the Tourniquet with 

scissors.  Then applied Xeroform and then tube gauze.





Progress/Results/Core Measures


Results/Orders


My Orders





Orders - ANNY HYLTON


Hand, Left, 3 Views (1/26/21 16:12)





Vital Signs/I&O











 1/26/21





 15:55


 


Temp 36.2


 


Pulse 71


 


Resp 20


 


B/P (MAP) 124/78 (93)


 


Pulse Ox 98


 


O2 Delivery Room Air














Blood Pressure Mean:                    93











Departure


Impression





   Primary Impression:  


   Finger tip laceration


Disposition:  01 HOME, SELF-CARE


Condition:  Stable





Departure-Patient Inst.


Decision time for Depature:  16:43


Referrals:  


[facility] [place]


Primary Care Physician


Patient Instructions:  Laceration Repair With Stitches (DC)





Add. Discharge Instructions:  


Keep the dressing clean dry and in place until you return to the ER Thursday 

afternoon or evening for dressing change and wound check.





All discharge instructions reviewed with patient and/or family. Voiced 

understanding.


Scripts


Hydrocodone/Acetaminophen (Hydrocodone-Acetamin 5-325 mg) 1 Each Tablet


1 EACH PO Q4H PRN for PAIN-MODERATE (5-7), #14 TAB


   Prov: ANNY HYLTON         1/26/21 


Cephalexin (Cephalexin) 500 Mg Tablet


500 MG PO TID, #20 TAB 0 Refills


   Prov: ANNY HYLTON         1/26/21











ANNY HYLTON             Jan 26, 2021 16:12

## 2021-01-26 NOTE — DIAGNOSTIC IMAGING REPORT
INDICATION: Injury to left middle finger



AP, oblique, and lateral views of the left hand are obtained. 



No acute fracture or acute bony abnormality is seen. There is

soft tissue swelling over the 3rd digit. There is no radiopaque

foreign body. There is advanced degenerative change of the 1st

carpometacarpal joint.



IMPRESSION:

No acute fracture or radiopaque foreign body. Advanced

degenerative changes of the 1st carpal metacarpal joint noted.



Dictated by: 



  Dictated on workstation # [id number]

## 2021-01-29 ENCOUNTER — HOSPITAL ENCOUNTER (EMERGENCY)
Dept: HOSPITAL 75 - ER | Age: 41
Discharge: HOME | End: 2021-01-29
Payer: SELF-PAY

## 2021-01-29 VITALS — HEIGHT: 70.87 IN | BODY MASS INDEX: 26.85 KG/M2 | WEIGHT: 191.8 LBS

## 2021-01-29 VITALS — SYSTOLIC BLOOD PRESSURE: 135 MMHG | DIASTOLIC BLOOD PRESSURE: 78 MMHG

## 2021-01-29 DIAGNOSIS — Z48.00: Primary | ICD-10-CM

## 2021-01-29 DIAGNOSIS — Z79.2: ICD-10-CM

## 2021-01-29 NOTE — ED SUTURE REMOVAL/WOUND CHECK
Suture/Wound Re-check


General Appearance:  WD/WN, no apparent distress


Neuro/Tendon:  normal sensation, normal motor functions


Skin Exam:  normal color, warm/dry





Physical Exam


Vital Signs





Vital Signs - First Documented








 1/29/21





 15:25


 


Temp 36.0


 


Pulse 79


 


Resp 22


 


B/P (MAP) 135/78


 


Pulse Ox 100


 


O2 Delivery Room Air





Capillary Refill :


General Appearance:  WD/WN, no apparent distress


Respiratory:  no respiratory distress, no accessory muscle use


Neurologic/Psychiatric:  alert, normal mood/affect, oriented x 3


Skin:  normal color, warm/dry


Comments


Appears to be healing well, no cellulitis or purulent drainage.





Departure


Impression





   Primary Impression:  


   Visit for wound care


Disposition:  01 HOME, SELF-CARE


Condition:  Stable





Departure-Patient Inst.


Decision time for Depature:  15:36


Referrals:  


[facility] [place] (PCP/Family)


Primary Care Physician


Patient Instructions:  Wound Care (DC)





Add. Discharge Instructions:  


1.  Return to ER for any concerns.  Otherwise return to ER 10 days after the 

sutures were placed to have them removed.  Change the dressing daily.  Continue 

antibiotics until they are finished.





All discharge instructions reviewed with patient and/or family. Voiced unde

rstanding.











ANNY HYLTON             Jan 29, 2021 15:36

## 2021-04-23 ENCOUNTER — HOSPITAL ENCOUNTER (EMERGENCY)
Dept: HOSPITAL 75 - ER | Age: 41
Discharge: HOME | End: 2021-04-23
Payer: SELF-PAY

## 2021-04-23 VITALS — DIASTOLIC BLOOD PRESSURE: 76 MMHG | SYSTOLIC BLOOD PRESSURE: 128 MMHG

## 2021-04-23 VITALS — HEIGHT: 73.03 IN | BODY MASS INDEX: 25.19 KG/M2 | WEIGHT: 190.04 LBS

## 2021-04-23 DIAGNOSIS — T14.8XXA: ICD-10-CM

## 2021-04-23 DIAGNOSIS — Z77.22: ICD-10-CM

## 2021-04-23 DIAGNOSIS — F15.90: ICD-10-CM

## 2021-04-23 DIAGNOSIS — R41.840: ICD-10-CM

## 2021-04-23 DIAGNOSIS — S06.0X9A: Primary | ICD-10-CM

## 2021-04-23 DIAGNOSIS — F31.9: ICD-10-CM

## 2021-04-23 DIAGNOSIS — Z88.8: ICD-10-CM
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
## 2021-04-23 DIAGNOSIS — R41.3: ICD-10-CM

## 2021-04-23 DIAGNOSIS — Z79.899: ICD-10-CM

## 2021-04-23 DIAGNOSIS — F41.9: ICD-10-CM

## 2021-04-23 LAB
ALBUMIN SERPL-MCNC: 4 GM/DL (ref 3.2–4.5)
ALP SERPL-CCNC: 57 U/L (ref 40–136)
ALT SERPL-CCNC: 19 U/L (ref 0–55)
APTT PPP: YELLOW S
BACTERIA #/AREA URNS HPF: NEGATIVE /HPF
BARBITURATES UR QL: NEGATIVE
BASOPHILS # BLD AUTO: 0 10^3/UL (ref 0–0.1)
BASOPHILS NFR BLD AUTO: 0 % (ref 0–10)
BENZODIAZ UR QL SCN: NEGATIVE
BILIRUB SERPL-MCNC: 0.2 MG/DL (ref 0.1–1)
BILIRUB UR QL STRIP: NEGATIVE
BUN/CREAT SERPL: 18
CALCIUM SERPL-MCNC: 8.9 MG/DL (ref 8.5–10.1)
CHLORIDE SERPL-SCNC: 107 MMOL/L (ref 98–107)
CO2 SERPL-SCNC: 21 MMOL/L (ref 21–32)
COCAINE UR QL: NEGATIVE
CREAT SERPL-MCNC: 0.77 MG/DL (ref 0.6–1.3)
EOSINOPHIL # BLD AUTO: 0.5 10^3/UL (ref 0–0.3)
EOSINOPHIL NFR BLD AUTO: 5 % (ref 0–10)
ERYTHROCYTE [SEDIMENTATION RATE] IN BLOOD: 5 MM/HR (ref 0–15)
FIBRINOGEN PPP-MCNC: CLEAR MG/DL
GFR SERPLBLD BASED ON 1.73 SQ M-ARVRAT: > 60 ML/MIN
GLUCOSE SERPL-MCNC: 119 MG/DL (ref 70–105)
GLUCOSE UR STRIP-MCNC: NEGATIVE MG/DL
HCT VFR BLD CALC: 38 % (ref 40–54)
HGB BLD-MCNC: 12.3 G/DL (ref 13.3–17.7)
KETONES UR QL STRIP: NEGATIVE
LEUKOCYTE ESTERASE UR QL STRIP: NEGATIVE
LYMPHOCYTES # BLD AUTO: 2.1 10^3/UL (ref 1–4)
LYMPHOCYTES NFR BLD AUTO: 21 % (ref 12–44)
MANUAL DIFFERENTIAL PERFORMED BLD QL: NO
MCH RBC QN AUTO: 32 PG (ref 25–34)
MCHC RBC AUTO-ENTMCNC: 33 G/DL (ref 32–36)
MCV RBC AUTO: 97 FL (ref 80–99)
METHADONE UR QL SCN: NEGATIVE
METHAMPHETAMINE SCREEN URINE S: POSITIVE
MONOCYTES # BLD AUTO: 0.9 10^3/UL (ref 0–1)
MONOCYTES NFR BLD AUTO: 9 % (ref 0–12)
NEUTROPHILS # BLD AUTO: 6.4 10^3/UL (ref 1.8–7.8)
NEUTROPHILS NFR BLD AUTO: 65 % (ref 42–75)
NITRITE UR QL STRIP: NEGATIVE
OPIATES UR QL SCN: NEGATIVE
OXYCODONE UR QL: NEGATIVE
PH UR STRIP: 6.5 [PH] (ref 5–9)
PLATELET # BLD: 322 10^3/UL (ref 130–400)
PMV BLD AUTO: 9.9 FL (ref 9–12.2)
POTASSIUM SERPL-SCNC: 4 MMOL/L (ref 3.6–5)
PROPOXYPH UR QL: NEGATIVE
PROT SERPL-MCNC: 6.3 GM/DL (ref 6.4–8.2)
PROT UR QL STRIP: NEGATIVE
RBC #/AREA URNS HPF: (no result) /HPF
SODIUM SERPL-SCNC: 135 MMOL/L (ref 135–145)
SP GR UR STRIP: 1.01 (ref 1.02–1.02)
T4 FREE SERPL-MCNC: 0.84 NG/DL (ref 0.7–1.48)
TRICYCLICS UR QL SCN: NEGATIVE
WBC # BLD AUTO: 9.9 10^3/UL (ref 4.3–11)
WBC #/AREA URNS HPF: (no result) /HPF

## 2021-04-23 PROCEDURE — 36415 COLL VENOUS BLD VENIPUNCTURE: CPT

## 2021-04-23 PROCEDURE — 70450 CT HEAD/BRAIN W/O DYE: CPT

## 2021-04-23 PROCEDURE — 84443 ASSAY THYROID STIM HORMONE: CPT

## 2021-04-23 PROCEDURE — 72125 CT NECK SPINE W/O DYE: CPT

## 2021-04-23 PROCEDURE — 86141 C-REACTIVE PROTEIN HS: CPT

## 2021-04-23 PROCEDURE — 71046 X-RAY EXAM CHEST 2 VIEWS: CPT

## 2021-04-23 PROCEDURE — 81000 URINALYSIS NONAUTO W/SCOPE: CPT

## 2021-04-23 PROCEDURE — 80306 DRUG TEST PRSMV INSTRMNT: CPT

## 2021-04-23 PROCEDURE — 85025 COMPLETE CBC W/AUTO DIFF WBC: CPT

## 2021-04-23 PROCEDURE — 80320 DRUG SCREEN QUANTALCOHOLS: CPT

## 2021-04-23 PROCEDURE — 85652 RBC SED RATE AUTOMATED: CPT

## 2021-04-23 PROCEDURE — 84439 ASSAY OF FREE THYROXINE: CPT

## 2021-04-23 PROCEDURE — 99283 EMERGENCY DEPT VISIT LOW MDM: CPT

## 2021-04-23 PROCEDURE — 80053 COMPREHEN METABOLIC PANEL: CPT
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
## 2021-04-23 NOTE — DIAGNOSTIC IMAGING REPORT
INDICATION: Chest pain



PA and lateral chest



Heart size and pulmonary vascularity are normal. Lungs are clear.

There are no effusions or pneumothoraces.



IMPRESSION: Negative chest



Dictated by: 



  Dictated on workstation # [id number]
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
## 2021-04-27 ENCOUNTER — HOSPITAL ENCOUNTER (INPATIENT)
Dept: HOSPITAL 75 - ER | Age: 41
LOS: 2 days | Discharge: HOME | DRG: 552 | End: 2021-04-29
Attending: FAMILY MEDICINE | Admitting: FAMILY MEDICINE
Payer: SELF-PAY

## 2021-04-27 VITALS — BODY MASS INDEX: 24.78 KG/M2 | HEIGHT: 72.83 IN | WEIGHT: 186.95 LBS

## 2021-04-27 VITALS — SYSTOLIC BLOOD PRESSURE: 109 MMHG | DIASTOLIC BLOOD PRESSURE: 56 MMHG

## 2021-04-27 DIAGNOSIS — S32.019A: Primary | ICD-10-CM

## 2021-04-27 DIAGNOSIS — F41.9: ICD-10-CM

## 2021-04-27 DIAGNOSIS — S32.10XA: ICD-10-CM

## 2021-04-27 DIAGNOSIS — F17.210: ICD-10-CM

## 2021-04-27 DIAGNOSIS — F15.90: ICD-10-CM

## 2021-04-27 DIAGNOSIS — Z88.8: ICD-10-CM

## 2021-04-27 DIAGNOSIS — R74.8: ICD-10-CM

## 2021-04-27 DIAGNOSIS — S32.029A: ICD-10-CM

## 2021-04-27 DIAGNOSIS — F90.9: ICD-10-CM
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
## 2021-04-27 DIAGNOSIS — Z79.1: ICD-10-CM

## 2021-04-27 DIAGNOSIS — Z79.2: ICD-10-CM

## 2021-04-27 DIAGNOSIS — S01.512A: ICD-10-CM
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
## 2021-04-27 DIAGNOSIS — F12.90: ICD-10-CM

## 2021-04-27 DIAGNOSIS — Z72.89: ICD-10-CM

## 2021-04-27 DIAGNOSIS — F31.9: ICD-10-CM
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
## 2021-04-27 LAB
ALBUMIN SERPL-MCNC: 4.3 GM/DL (ref 3.2–4.5)
ALP SERPL-CCNC: 71 U/L (ref 40–136)
ALT SERPL-CCNC: 116 U/L (ref 0–55)
APTT PPP: YELLOW S
BACTERIA #/AREA URNS HPF: NEGATIVE /HPF
BARBITURATES UR QL: NEGATIVE
BASOPHILS # BLD AUTO: 0 10^3/UL (ref 0–0.1)
BASOPHILS NFR BLD AUTO: 0 % (ref 0–10)
BASOPHILS NFR BLD MANUAL: 0 %
BENZODIAZ UR QL SCN: NEGATIVE
BILIRUB SERPL-MCNC: 0.6 MG/DL (ref 0.1–1)
BILIRUB UR QL STRIP: NEGATIVE
BUN/CREAT SERPL: 14
CALCIUM SERPL-MCNC: 10.1 MG/DL (ref 8.5–10.1)
CHLORIDE SERPL-SCNC: 103 MMOL/L (ref 98–107)
CO2 SERPL-SCNC: 25 MMOL/L (ref 21–32)
COCAINE UR QL: NEGATIVE
CREAT SERPL-MCNC: 0.86 MG/DL (ref 0.6–1.3)
EOSINOPHIL # BLD AUTO: 0.1 10^3/UL (ref 0–0.3)
EOSINOPHIL NFR BLD AUTO: 1 % (ref 0–10)
EOSINOPHIL NFR BLD MANUAL: 0 %
FIBRINOGEN PPP-MCNC: CLEAR MG/DL
GFR SERPLBLD BASED ON 1.73 SQ M-ARVRAT: > 60 ML/MIN
GLUCOSE SERPL-MCNC: 133 MG/DL (ref 70–105)
GLUCOSE UR STRIP-MCNC: NEGATIVE MG/DL
HCT VFR BLD CALC: 47 % (ref 40–54)
HGB BLD-MCNC: 14.5 G/DL (ref 13.3–17.7)
INR PPP: 1 (ref 0.8–1.4)
KETONES UR QL STRIP: NEGATIVE
LEUKOCYTE ESTERASE UR QL STRIP: NEGATIVE
LYMPHOCYTES # BLD AUTO: 1.4 10^3/UL (ref 1–4)
LYMPHOCYTES NFR BLD AUTO: 10 % (ref 12–44)
MANUAL DIFFERENTIAL PERFORMED BLD QL: YES
MCH RBC QN AUTO: 31 PG (ref 25–34)
MCHC RBC AUTO-ENTMCNC: 31 G/DL (ref 32–36)
MCV RBC AUTO: 100 FL (ref 80–99)
METHADONE UR QL SCN: NEGATIVE
METHAMPHETAMINE SCREEN URINE S: NEGATIVE
MONOCYTES # BLD AUTO: 0.9 10^3/UL (ref 0–1)
MONOCYTES NFR BLD AUTO: 6 % (ref 0–12)
MONOCYTES NFR BLD: 5 %
NEUTROPHILS # BLD AUTO: 12 10^3/UL (ref 1.8–7.8)
NEUTROPHILS NFR BLD AUTO: 82 % (ref 42–75)
NEUTS BAND NFR BLD MANUAL: 85 %
NEUTS BAND NFR BLD: 0 %
NITRITE UR QL STRIP: NEGATIVE
OPIATES UR QL SCN: NEGATIVE
OXYCODONE UR QL: NEGATIVE
PH UR STRIP: 7 [PH] (ref 5–9)
PLATELET # BLD: 334 10^3/UL (ref 130–400)
PMV BLD AUTO: 10.6 FL (ref 9–12.2)
POTASSIUM SERPL-SCNC: 3.8 MMOL/L (ref 3.6–5)
PROPOXYPH UR QL: NEGATIVE
PROT SERPL-MCNC: 7.2 GM/DL (ref 6.4–8.2)
PROT UR QL STRIP: NEGATIVE
PROTHROMBIN TIME: 13.7 SEC (ref 12.2–14.7)
RBC #/AREA URNS HPF: (no result) /HPF
RBC MORPH BLD: NORMAL
SODIUM SERPL-SCNC: 138 MMOL/L (ref 135–145)
SP GR UR STRIP: 1.01 (ref 1.02–1.02)
SQUAMOUS #/AREA URNS HPF: (no result) /HPF
TRICYCLICS UR QL SCN: NEGATIVE
VARIANT LYMPHS NFR BLD MANUAL: 10 %
WBC # BLD AUTO: 14.6 10^3/UL (ref 4.3–11)
WBC #/AREA URNS HPF: (no result) /HPF

## 2021-04-27 PROCEDURE — 80053 COMPREHEN METABOLIC PANEL: CPT

## 2021-04-27 PROCEDURE — 85025 COMPLETE CBC W/AUTO DIFF WBC: CPT

## 2021-04-27 PROCEDURE — 74177 CT ABD & PELVIS W/CONTRAST: CPT

## 2021-04-27 PROCEDURE — 80306 DRUG TEST PRSMV INSTRMNT: CPT

## 2021-04-27 PROCEDURE — 94664 DEMO&/EVAL PT USE INHALER: CPT

## 2021-04-27 PROCEDURE — 94760 N-INVAS EAR/PLS OXIMETRY 1: CPT

## 2021-04-27 PROCEDURE — 80074 ACUTE HEPATITIS PANEL: CPT

## 2021-04-27 PROCEDURE — 93970 EXTREMITY STUDY: CPT

## 2021-04-27 PROCEDURE — 80320 DRUG SCREEN QUANTALCOHOLS: CPT

## 2021-04-27 PROCEDURE — 85007 BL SMEAR W/DIFF WBC COUNT: CPT

## 2021-04-27 PROCEDURE — 72125 CT NECK SPINE W/O DYE: CPT

## 2021-04-27 PROCEDURE — 81000 URINALYSIS NONAUTO W/SCOPE: CPT

## 2021-04-27 PROCEDURE — 85610 PROTHROMBIN TIME: CPT

## 2021-04-27 PROCEDURE — 70450 CT HEAD/BRAIN W/O DYE: CPT

## 2021-04-27 PROCEDURE — 36415 COLL VENOUS BLD VENIPUNCTURE: CPT

## 2021-04-27 PROCEDURE — 85027 COMPLETE CBC AUTOMATED: CPT

## 2021-04-27 PROCEDURE — 71260 CT THORAX DX C+: CPT

## 2021-04-27 PROCEDURE — 80329 ANALGESICS NON-OPIOID 1 OR 2: CPT

## 2021-04-27 RX ADMIN — HYDROMORPHONE HYDROCHLORIDE PRN MG: 2 INJECTION, SOLUTION INTRAMUSCULAR; INTRAVENOUS; SUBCUTANEOUS at 22:45

## 2021-04-27 RX ADMIN — SODIUM CHLORIDE, SODIUM LACTATE, POTASSIUM CHLORIDE, AND CALCIUM CHLORIDE SCH MLS/HR: 600; 310; 30; 20 INJECTION, SOLUTION INTRAVENOUS at 21:46

## 2021-04-27 NOTE — ED BACK PAIN
General


Stated Complaint:  SPLIT TONGUE,BACK/NECK PAIN,R ARM


Source of Information:  Patient


Exam Limitations:  No Limitations





History of Present Illness


Date Seen by Provider:  2021


Time Seen by Provider:  17:52


Initial Comments


To ER with to ER by private vehicle from home with reports of a fall about 20 

feet off of a ladder last night while trimming some trees. He smoked a joint and

had a few beer and decided that it was a good time to trim tree branches. He's 

not sure how he landed but does not believe he lost consciousness.  Complains of

some pain to his neck and low back.  He also bit both sides of his tongue.


Location:  Lumbar Spine


Timing/Duration:  12-24 Hours


Severity:  Severe


Pain/Injury Location:  Back


Associated Symptoms:  lower back pain





Allergies and Home Medications


Allergies


Coded Allergies:  


     prednisone (Verified  Allergy, Unknown, 2/3/10)





Home Medications


Amoxicillin 500 Mg Capsule, 500 MG PO TID


   Prescribed by: BALDEMAR BREWSTER on 20 142


Cephalexin 500 Mg Capsule, 500 MG PO TID


   Prescribed by: HARDEEP ROGERS on 20


Cephalexin 500 Mg Tablet, 500 MG PO TID


   Prescribed by: ANNY HYLTON on 21 1645


Cyclobenzaprine HCl 10 Mg Tablet, 10 MG PO Q8H PRN for SPASMS


   Prescribed by: ELISE BRIGHT on 10/4/20 194


Hydrocodone/Acetaminophen 1 Each Tablet, 1 EACH PO Q6H PRN for PAIN-MODERATE (5-

7)


   Prescribed by: ANNY HYLTON on 20 1316


Hydrocodone/Acetaminophen 1 Each Tablet, 1 EACH PO Q4H PRN for PAIN-MODERATE (5-

7)


   Prescribed by: ANNY HLYTON on 21 1646


Ibuprofen 600 Mg Tablet, 600 MG PO Q6H PRN for PAIN


   Prescribed by: JESSICA GIANG on 17 1521


Lisdexamfetamine Dimesylate 40 Mg Capsule, 40 MG PO DAILY, (Reported)


Lithium Carbonate 450 Mg Tablet.sa, 600 MG PO BID, (Reported)


Loratadine 10 Mg Tablet, 10 MG PO DAILY PRN PRN for ITCHING


   Prescribed by: ELISE BRIGHT on 20 1829


Methocarbamol 750 Mg Tablet, 750 MG PO Q6-8HR


   Prescribed by: ANNY HYLTON on 21 1221


Mirtazapine 30 Mg Tablet, 45 MG PO HS, (Reported)


Naproxen 500 Mg Tablet, 500 MG PO BID PRN for PAIN-MILD (1-4)


   Prescribed by: ANNY HYLTON on 21 1221


Ondansetron 4 Mg Tab.rapdis, 4 MG SL Q4H PRN for NAUSEA/VOMITING


   Prescribed by: HARDEEP ROGERS on 20


Ondansetron 4 Mg Tab.rapdis, 4 MG PO Q6H PRN for NAUSEA/VOMITING


   Prescribed by: ELISE BRIGHT on 20 182


Quetiapine Fumarate 300 Mg Tab.sr.24h, 300 MG PO HS, (Reported)


Tramadol HCl 50 Mg Tablet, 50 MG PO Q6H PRN for PAIN


   Prescribed by: ELISE BRIGHT on 10/4/20 1940





Patient Home Medication List


Home Medication List Reviewed:  Yes





Review of Systems


Constitutional:  see HPI


EENTM:  see HPI


Respiratory:  no symptoms reported


Cardiovascular:  no symptoms reported


Genitourinary:  no symptoms reported


Musculoskeletal:  see HPI, back pain


Skin:  no symptoms reported


Psychiatric/Neurological:  No Symptoms Reported





Past Medical-Social-Family Hx


Patient Social History


Drug of Choice:  history of IV drug use, ACID


Type Used:  Cigarettes


2nd Hand Smoke Exposure:  Yes


Recent Hopitalizations:  No





Immunizations Up To Date


Tetanus Booster (TDap):  Unknown





Seasonal Allergies


Seasonal Allergies:  No





Past Medical History


Surgeries:  Yes (LT KNEE AND QUAD, RT SHOULDER, RT KNEE, LT WRIST, JAW 

RECONSTRUCTION)


Orthopedic


Respiratory:  No


Cardiac:  Yes


Endocarditis


Neurological:  No


Reproductive Disorders:  No


Gastrointestinal:  No


Musculoskeletal:  Yes


Fractures


Endocrine:  No


HEENT:  No


Cancer:  No


Psychosocial:  Yes


ADD/ADHD, Anxiety, Bipolar, Depression


Integumentary:  No


Blood Disorders:  No





Family Medical History





Patient reports no known family medical history.


No Pertinent Family Hx





Physical Exam


Vital Signs





Vital Signs - First Documented








 21





 17:45


 


Temp 36.9


 


Pulse 71


 


Resp 18


 


B/P (MAP) 120/76 (91)


 


Pulse Ox 98





Capillary Refill :


Height, Weight, BMI


Height: 6'1"


Weight: 188lbs. 6.4oz. 85.511991ck; 25.00 BMI


Method:Estimated


General Appearance:  No Apparent Distress, WD/WN, Thin


HEENT:  TMs Normal, Normal ENT Inspection, Other (lateral tongue lac bilaterally

without bleeding)


Neck:  Full Range of Motion, Normal Inspection


Respiratory:  No Accessory Muscle Use, No Respiratory Distress


Gastrointestinal:  Non Tender, Soft


Back:  Normal Inspection, Decreased Range of Motion, Vertebral Tenderness


Extremity:  Normal Capillary Refill, Normal Inspection, Other (no heel pain)


Neurologic/Psychiatric:  Alert, Oriented x3


Skin:  Normal Color, Warm/Dry





Procedures/Interventions





   Suture Size:  5-0





Progress/Results/Core Measures


Results/Orders


Lab Results





Laboratory Tests








Test


 21


17:50 21


18:37 Range/Units


 


 


White Blood Count


 14.6 H


 


 4.3-11.0


10^3/uL


 


Red Blood Count


 4.65 


 


 4.30-5.52


10^6/uL


 


Hemoglobin 14.5   13.3-17.7  g/dL


 


Hematocrit 47   40-54  %


 


Mean Corpuscular Volume 100 H  80-99  fL


 


Mean Corpuscular Hemoglobin 31   25-34  pg


 


Mean Corpuscular Hemoglobin


Concent 31 L


 


 32-36  g/dL





 


Red Cell Distribution Width 14.4   10.0-14.5  %


 


Platelet Count


 334 


 


 130-400


10^3/uL


 


Mean Platelet Volume 10.6   9.0-12.2  fL


 


Immature Granulocyte % (Auto) 1    %


 


Neutrophils (%) (Auto) 82 H  42-75  %


 


Lymphocytes (%) (Auto) 10 L  12-44  %


 


Monocytes (%) (Auto) 6   0-12  %


 


Eosinophils (%) (Auto) 1   0-10  %


 


Basophils (%) (Auto) 0   0-10  %


 


Neutrophils # (Auto)


 12.0 H


 


 1.8-7.8


10^3/uL


 


Lymphocytes # (Auto)


 1.4 


 


 1.0-4.0


10^3/uL


 


Monocytes # (Auto)


 0.9 


 


 0.0-1.0


10^3/uL


 


Eosinophils # (Auto)


 0.1 


 


 0.0-0.3


10^3/uL


 


Basophils # (Auto)


 0.0 


 


 0.0-0.1


10^3/uL


 


Immature Granulocyte # (Auto)


 0.1 


 


 0.0-0.1


10^3/uL


 


Neutrophils % (Manual) 85    %


 


Lymphocytes % (Manual) 10    %


 


Monocytes % (Manual) 5    %


 


Eosinophils % (Manual) 0    %


 


Basophils % (Manual) 0    %


 


Band Neutrophils 0    %


 


Blood Morphology Comment NORMAL    


 


Prothrombin Time 13.7   12.2-14.7  SEC


 


INR Comment 1.0   0.8-1.4  


 


Sodium Level 138   135-145  MMOL/L


 


Potassium Level 3.8   3.6-5.0  MMOL/L


 


Chloride Level 103     MMOL/L


 


Carbon Dioxide Level 25   21-32  MMOL/L


 


Anion Gap 10   5-14  MMOL/L


 


Blood Urea Nitrogen 12   7-18  MG/DL


 


Creatinine


 0.86 


 


 0.60-1.30


MG/DL


 


Estimat Glomerular Filtration


Rate > 60 


 


  





 


BUN/Creatinine Ratio 14    


 


Glucose Level 133 H    MG/DL


 


Calcium Level 10.1   8.5-10.1  MG/DL


 


Corrected Calcium 9.9   8.5-10.1  MG/DL


 


Total Bilirubin 0.6   0.1-1.0  MG/DL


 


Aspartate Amino Transf


(AST/SGOT) 121 H


 


 5-34  U/L





 


Alanine Aminotransferase


(ALT/SGPT) 116 H


 


 0-55  U/L





 


Alkaline Phosphatase 71     U/L


 


Total Protein 7.2   6.4-8.2  GM/DL


 


Albumin 4.3   3.2-4.5  GM/DL


 


Serum Alcohol < 10   <10  MG/DL


 


Urine Color  YELLOW   


 


Urine Clarity  CLEAR   


 


Urine pH  7.0  5-9  


 


Urine Specific Gravity  1.010 L 1.016-1.022  


 


Urine Protein  NEGATIVE  NEGATIVE  


 


Urine Glucose (UA)  NEGATIVE  NEGATIVE  


 


Urine Ketones  NEGATIVE  NEGATIVE  


 


Urine Nitrite  NEGATIVE  NEGATIVE  


 


Urine Bilirubin  NEGATIVE  NEGATIVE  


 


Urine Urobilinogen  0.2  < = 1.0  MG/DL


 


Urine Leukocyte Esterase  NEGATIVE  NEGATIVE  


 


Urine RBC (Auto)  NEGATIVE  NEGATIVE  


 


Urine RBC  NONE   /HPF


 


Urine WBC  NONE   /HPF


 


Urine Squamous Epithelial


Cells 


 NONE 


  /HPF





 


Urine Crystals  NONE   /LPF


 


Urine Bacteria  NEGATIVE   /HPF


 


Urine Casts  NONE   /LPF


 


Urine Mucus  NEGATIVE   /LPF


 


Urine Culture Indicated  NO   


 


Urine Opiates Screen  NEGATIVE  NEGATIVE  


 


Urine Oxycodone Screen  NEGATIVE  NEGATIVE  


 


Urine Methadone Screen  NEGATIVE  NEGATIVE  


 


Urine Propoxyphene Screen  NEGATIVE  NEGATIVE  


 


Urine Barbiturates Screen  NEGATIVE  NEGATIVE  


 


Ur Tricyclic Antidepressants


Screen 


 NEGATIVE 


 NEGATIVE  





 


Urine Phencyclidine Screen  NEGATIVE  NEGATIVE  


 


Urine Amphetamines Screen  POSITIVE H NEGATIVE  


 


Urine Methamphetamines Screen  NEGATIVE  NEGATIVE  


 


Urine Benzodiazepines Screen  NEGATIVE  NEGATIVE  


 


Urine Cocaine Screen  NEGATIVE  NEGATIVE  


 


Urine Cannabinoids Screen  POSITIVE H NEGATIVE  








My Orders





Orders - ANNY HYLTON APRN


Ct Head/Cervical Spine Wo (21 17:48)


Ct Chest/Abdomen/Pelvis W (21 17:48)


Cbc With Automated Diff (21 17:48)


Comprehensive Metabolic Panel (21 17:48)


Alcohol (21 17:48)


Protime With Inr (21 17:48)


Ua Culture If Indicated (21 17:48)


Drug Screen Stat (Urine) (21 17:48)


Ed Iv/Invasive Line Start (21 17:48)


Fentanyl  Inj (Sublimaze Injection) (21 18:00)


Iohexol Injection (Omnipaque 350 Mg/Ml 1 (21 18:00)


Received Contrast (Hold Metformin- Contr (21 18:00)


Sodium Chloride Flush (Catheter Flush Sy (21 18:00)


Ns (Ivpb) (Sodium Chloride 0.9% Ivpb Bag (21 18:00)


Hydromorphone Injection (Dilaudid Inject (21 18:15)


Manual Differential (21 17:50)


Hydrocodone/Apap 5/325 Tablet (Lortab 5 (21 19:15)


Ketamine Syringe (Ed Only) (Ketamine Syr (21 19:15)


Ns Iv 500 Ml (Sodium Chloride 0.9%) (21 19:15)





Medications Given in ED





Current Medications








 Medications  Dose


 Ordered  Sig/Daniele


 Route  Start Time


 Stop Time Status Last Admin


Dose Admin


 


 Fentanyl Citrate  50 mcg  ONCE  ONCE


 IVP  21 18:00


 21 18:01 DC 21 17:55


50 MCG


 


 Hydromorphone HCl  0.5 mg  ONCE  ONCE


 IV  21 18:15


 21 18:16 DC 21 18:44


0.5 MG


 


 Iohexol  100 ml  ONCE  ONCE


 IV  21 18:00


 21 18:01 DC 21 18:29


100 ML


 


 Sodium Chloride  10 ml  AS NEEDED  PRN


 IV  21 18:00


    21 18:29


10 ML


 


 Sodium Chloride  100 ml  ONCE  ONCE


 IV  21 18:00


 21 18:01 DC 21 18:29


80 ML








Vital Signs/I&O











 21





 17:45


 


Temp 36.9


 


Pulse 71


 


Resp 18


 


B/P (MAP) 120/76 (91)


 


Pulse Ox 98











Diagnostic Imaging





   Diagonstic Imaging:  CT


Comments


NAME:   CARTER LANDIN II


MED REC#:   [id number]


ACCOUNT#:   [id number]


PT STATUS:   REG ER


:   1980


PHYSICIAN:   ANNY HYLTON


ADMIT DATE:   21/ER


                                   ***Draft***


Date of Exam:21





CT HEAD/CERVICAL SPINE WO








PROCEDURE: CT head and CT cervical spine without contrast.





TECHNIQUE: Multiple contiguous axial images were obtained through


the brain and cervical spine without the use of intravenous


contrast. Sagittal and coronal reformations through the cervical


spine were then performed. Auto Exposure Controls were utilized


during the CT exam to meet ALARA standards for radiation dose


reduction. 





INDICATION: Fall, head and neck pain.





Study compared 2021.





Head: The exam is stable and normal. No hemorrhage,


hydrocephalus, edema, mass, mass effect or fracture. No


hemo-sinus. No change.





Cervical spine: Degenerative changes to the cervical spine


greatest at C5-C6 and C6-C7 chronic. No cervical fracture. No


paraspinal hemorrhage. No facet joint dislocation. No change in


alignment.





IMPRESSION: Stable unremarkable CT head and cervical spine showed


no acute or posttraumatic sequelae.





  Dictated on workstation # [id number]








Dict:   21


Trans:   21 183


[facility] [id number]





Interpreted by:     STACEY BARRERA


Electronically signed by:





Departure


Communication (Admissions)


183-bladder is very distended on CT.  He does have the urge to urinate.  I 

provided him with a urinal and he was able to produce 1100 mL of clear yellow 

urine.


-Dr. Rivera about the possibility of a small hemoperitoneum though it is 

likely just from the sacral fracture.  Will admit for physical therapy consult 

pain control.  Spoke with Dr. Cai, agrees with this plan.





Impression





   Primary Impression:  


   Spinous process fracture


   Additional Impressions:  


   Lumbar transverse process fracture


   Intractable pain


   Sacral fracture, closed


Disposition:   ADMITTED AS INPATIENT


Condition:  Stable





Admissions


Decision to Admit Reason:  Admit from ER (Trauma)


Decision to Admit/Date:  2021


Time/Decision to Admit Time:  19:01





Departure-Patient Inst.


Referrals:  


[facility] (PCP/Family)


Primary Care Physician











ANNY HYLTON APRN             2021 17:53

## 2021-04-27 NOTE — DIAGNOSTIC IMAGING REPORT
PROCEDURE: CT chest, abdomen, and pelvis with contrast.



TECHNIQUE: Multiple contiguous axial images were obtained through

the chest, abdomen, and pelvis after the administration of

intravenous contrast. Auto Exposure Controls were utilized during

the CT exam to meet ALARA standards for radiation dose reduction.





INDICATION:  Fall with back pain and tailbone pain.



Correlation is made with prior CT from 10/04/2020.



CT CHEST:



No definite mediastinal hematoma or great vessel injury is seen.

There is no pericardial or pleural fluid identified. There appear

to be some patchy infiltrates or atelectasis in the posterior

lung bases. Bony structures appear intact.



IMPRESSION: Bibasilar atelectasis or infiltrate. No other

significant abnormality in the chest is identified.



CT abdomen and pelvis:



No focal liver or splenic laceration is seen. The gallbladder is

unremarkable. Pancreas unremarkable. No adrenal or renal injury

is seen. Aorta is unremarkable. Bladder is moderately distended.

There is some minimal free fluid in the dependent portion of the

pelvis which does show some increased density. Small amount of

hemoperitoneum cannot be entirely excluded. There are fractures

involving the spinous processes at L1 and L2. A right transverse

process fracture at L3 was present on prior exam. There is a new

left transverse process fracture at L2. There does appear to be a

fracture left para-midline of the sacrum. There is also

transversely oriented lucency in the region of the right sacral

ala suspicious for a fracture. SI joints are not widened. No

other fractures are seen.



IMPRESSION:

1. No evidence of abdominal or pelvic visceral injury.

2. There are several fractures present, as described. In

particular, the spinous processes at L1-L2 as well as left

transverse process fracture at L2. There appear to be sacral

fractures present as well. There is a probable trace amount of

blood in the presacral space.



Dictated by: 



  Dictated on workstation # [id number]

## 2021-04-27 NOTE — DIAGNOSTIC IMAGING REPORT
PROCEDURE: CT head and CT cervical spine without contrast.



TECHNIQUE: Multiple contiguous axial images were obtained through

the brain and cervical spine without the use of intravenous

contrast. Sagittal and coronal reformations through the cervical

spine were then performed. Auto Exposure Controls were utilized

during the CT exam to meet ALARA standards for radiation dose

reduction. 



INDICATION: Fall, head and neck pain.



Study compared 04/23/2021.



Head: The exam is stable and normal. No hemorrhage,

hydrocephalus, edema, mass, mass effect or fracture. No

hemo-sinus. No change.



Cervical spine: Degenerative changes to the cervical spine

greatest at C5-C6 and C6-C7 chronic. No cervical fracture. No

paraspinal hemorrhage. No facet joint dislocation. No change in

alignment.



IMPRESSION: Stable unremarkable CT head and cervical spine showed

no acute or posttraumatic sequelae.



Dictated by: 



  Dictated on workstation # [id number]

## 2021-04-28 VITALS — DIASTOLIC BLOOD PRESSURE: 65 MMHG | SYSTOLIC BLOOD PRESSURE: 100 MMHG

## 2021-04-28 VITALS — SYSTOLIC BLOOD PRESSURE: 111 MMHG | DIASTOLIC BLOOD PRESSURE: 64 MMHG

## 2021-04-28 VITALS — DIASTOLIC BLOOD PRESSURE: 56 MMHG | SYSTOLIC BLOOD PRESSURE: 104 MMHG

## 2021-04-28 VITALS — SYSTOLIC BLOOD PRESSURE: 106 MMHG | DIASTOLIC BLOOD PRESSURE: 95 MMHG

## 2021-04-28 VITALS — SYSTOLIC BLOOD PRESSURE: 105 MMHG | DIASTOLIC BLOOD PRESSURE: 67 MMHG

## 2021-04-28 VITALS — SYSTOLIC BLOOD PRESSURE: 133 MMHG | DIASTOLIC BLOOD PRESSURE: 74 MMHG

## 2021-04-28 VITALS — DIASTOLIC BLOOD PRESSURE: 63 MMHG | SYSTOLIC BLOOD PRESSURE: 106 MMHG

## 2021-04-28 LAB
BASOPHILS # BLD AUTO: 0.1 10^3/UL (ref 0–0.1)
BASOPHILS NFR BLD AUTO: 1 % (ref 0–10)
EOSINOPHIL # BLD AUTO: 0.4 10^3/UL (ref 0–0.3)
EOSINOPHIL NFR BLD AUTO: 4 % (ref 0–10)
HCT VFR BLD CALC: 39 % (ref 40–54)
HGB BLD-MCNC: 12.3 G/DL (ref 13.3–17.7)
LYMPHOCYTES # BLD AUTO: 2.6 10^3/UL (ref 1–4)
LYMPHOCYTES NFR BLD AUTO: 23 % (ref 12–44)
MANUAL DIFFERENTIAL PERFORMED BLD QL: NO
MCH RBC QN AUTO: 32 PG (ref 25–34)
MCHC RBC AUTO-ENTMCNC: 32 G/DL (ref 32–36)
MCV RBC AUTO: 101 FL (ref 80–99)
MONOCYTES # BLD AUTO: 1 10^3/UL (ref 0–1)
MONOCYTES NFR BLD AUTO: 9 % (ref 0–12)
NEUTROPHILS # BLD AUTO: 7.2 10^3/UL (ref 1.8–7.8)
NEUTROPHILS NFR BLD AUTO: 64 % (ref 42–75)
PLATELET # BLD: 270 10^3/UL (ref 130–400)
PMV BLD AUTO: 10.6 FL (ref 9–12.2)
WBC # BLD AUTO: 11.4 10^3/UL (ref 4.3–11)

## 2021-04-28 RX ADMIN — KETOROLAC TROMETHAMINE PRN MG: 30 INJECTION, SOLUTION INTRAMUSCULAR; INTRAVENOUS at 18:04

## 2021-04-28 RX ADMIN — SODIUM CHLORIDE, SODIUM LACTATE, POTASSIUM CHLORIDE, AND CALCIUM CHLORIDE SCH MLS/HR: 600; 310; 30; 20 INJECTION, SOLUTION INTRAVENOUS at 06:36

## 2021-04-28 RX ADMIN — HYDROMORPHONE HYDROCHLORIDE PRN MG: 2 INJECTION, SOLUTION INTRAMUSCULAR; INTRAVENOUS; SUBCUTANEOUS at 23:14

## 2021-04-28 RX ADMIN — HYDROMORPHONE HYDROCHLORIDE PRN MG: 2 INJECTION, SOLUTION INTRAMUSCULAR; INTRAVENOUS; SUBCUTANEOUS at 09:50

## 2021-04-28 RX ADMIN — HYDROMORPHONE HYDROCHLORIDE PRN MG: 2 INJECTION, SOLUTION INTRAMUSCULAR; INTRAVENOUS; SUBCUTANEOUS at 04:50

## 2021-04-28 RX ADMIN — DOCUSATE SODIUM SCH MG: 50 LIQUID ORAL at 08:07

## 2021-04-28 RX ADMIN — HYDROMORPHONE HYDROCHLORIDE PRN MG: 2 INJECTION, SOLUTION INTRAMUSCULAR; INTRAVENOUS; SUBCUTANEOUS at 14:24

## 2021-04-28 RX ADMIN — HYDROMORPHONE HYDROCHLORIDE PRN MG: 2 INJECTION, SOLUTION INTRAMUSCULAR; INTRAVENOUS; SUBCUTANEOUS at 02:53

## 2021-04-28 RX ADMIN — HYDROMORPHONE HYDROCHLORIDE PRN MG: 2 INJECTION, SOLUTION INTRAMUSCULAR; INTRAVENOUS; SUBCUTANEOUS at 19:14

## 2021-04-28 RX ADMIN — DOCUSATE SODIUM SCH MG: 50 LIQUID ORAL at 20:16

## 2021-04-28 RX ADMIN — HYDROMORPHONE HYDROCHLORIDE PRN MG: 2 INJECTION, SOLUTION INTRAMUSCULAR; INTRAVENOUS; SUBCUTANEOUS at 06:38

## 2021-04-28 RX ADMIN — SODIUM CHLORIDE, SODIUM LACTATE, POTASSIUM CHLORIDE, AND CALCIUM CHLORIDE SCH MLS/HR: 600; 310; 30; 20 INJECTION, SOLUTION INTRAVENOUS at 23:14

## 2021-04-28 RX ADMIN — HYDROMORPHONE HYDROCHLORIDE PRN MG: 2 INJECTION, SOLUTION INTRAMUSCULAR; INTRAVENOUS; SUBCUTANEOUS at 00:46

## 2021-04-28 RX ADMIN — SODIUM CHLORIDE, SODIUM LACTATE, POTASSIUM CHLORIDE, AND CALCIUM CHLORIDE SCH MLS/HR: 600; 310; 30; 20 INJECTION, SOLUTION INTRAVENOUS at 12:33

## 2021-04-28 NOTE — CONSULTATION - SURGERY
History of Present Illness


History of Present Illness


Patient Consulted On(destiney/time)


4/28/21


 11:55


Date Seen by Provider:  Apr 28, 2021


Time Seen by Provider:  11:55


History of Present Illness


Consult requested by Dr. Cai for fall off ladder





Patient is a 40-year-old male who was trimming a tree when he was approximately 

15 to 20 feet up in the air and fell.  He is unsure of loss of consciousness.  

Patient states that he was able to get up off of the ground and was extremely 

sore.  His neighbor was helping him.  He took some marijuana Gummies which was 

able to help him get pain relief to where he could go to sleep.  Patient with 

complaints of some slight neck and low back pain.  This worsened yesterday when 

he decided to go to the emergency department yesterday evening.  Patient states 

that he still has moderate to severe low back pain which he states is 

uncomfortable and hard to get in a comfortable position.  Movement makes it 

worse.  Hard to find a good position.  Pain medication slightly helps.  Patient 

states he can ambulate but has pain.  Patient does admit to marijuana and 

methamphetamine use.  Patient has CT of the head and C-spine which demonstrated 

no acute traumatic injury.  Patient had CT of the chest abdomen pelvis with no 

evidence of abdomen or pelvic visceral injury.  Spinous process fracture L1 and 

L2 with left transverse process fracture L2 and sacral fractures and blood in 

the presacral space.





Allergies and Home Medications


Allergies


Coded Allergies:  


     prednisone (Verified  Allergy, Unknown, 2/3/10)





Home Medications


Lisdexamfetamine Dimesylate 60 Mg Capsule, 60 MG PO DAILY, (Reported)


   Last Action: Converted


Lithium Carbonate 600 Mg Capsule, 600 MG PO DAILY, (Reported)


   Last Action: Converted


Lithium Carbonate 600 Mg Capsule, 1,200 MG PO HS, (Reported)


   TAKES 2 (600MG) CAPS 


   Last Action: Converted


Mirtazapine 30 Mg Tablet, 30 MG PO HS, (Reported)


   Last Action: Converted


Quetiapine Fumarate 300 Mg Tablet, 300 MG PO HS, (Reported)


   Last Action: Converted





Patient Home Medication List


Home Medication List Reviewed:  Yes





Past Medical-Social-Family Hx


Patient Social History


Number of Drinks Today:  0


Drug of Choice:  history of IV drug use, ACID


Smoking Status:  Current Everyday Smoker


Type Used:  Cigarettes


2nd Hand Smoke Exposure:  Yes


Recent Hopitalizations:  No


Alcohol Use?:  Yes (1/2 pint 1-2x per week)


Substance type:  Amphetamines, Methamphetamine, Marijuana


Have you traveled recently?:  No





Immunizations Up To Date


Tetanus Booster (TDap):  Unknown





Seasonal Allergies


Seasonal Allergies:  No





Surgeries


History of Surgeries:  Yes (LT KNEE AND QUAD, RT SHOULDER, RT KNEE, LT WRIST, 

JAW RECONSTRUCTION)


Surgeries:  Orthopedic





Respiratory


History of Respiratory Disorde:  No





Cardiovascular


History of Cardiac Disorders:  Yes


Cardiac Disorders:  Endocarditis





Neurological


History of Neurological Disord:  No





Reproductive System


Hx Reproductive Disorders:  No





Gastrointestinal


History of Gastrointestinal Di:  No





Musculoskeletal


History of Musculoskeletal Dis:  Yes


Musculoskeletal Disorders:  Fractures





Endocrine


History of Endocrine Disorders:  No





HEENT


History of HEENT Disorders:  No





Cancer


History of Cancer:  No





Psychosocial


History of Psychiatric Problem:  Yes


Behavioral Health Disorders:  ADD/ADHD, Anxiety, Bipolar, Depression





Integumentary


History of Skin or Integumenta:  No





Blood Transfusions


History of Blood Disorders:  No





Family Medical History


Significant Family History:  No Pertinent Family Hx


Family Medial History:  


Patient reports no known family medical history.





Review of Systems-General


Constitutional:  No chills, No diaphoresis


EENTM:  other (Slight neck discomfort); No blurred vision, No double vision


Respiratory:  No cough, No dyspnea on exertion, No short of breath


Cardiovascular:  No chest pain, No palpitations


Gastrointestinal:  No abdominal pain, No nausea, No vomiting


Genitourinary:  No decreased output, No discharge


Musculoskeletal:  back pain, neck pain


Skin:  other (Multiple cuts diffusely on skin superficial)


Psychiatric/Neurological:  Denies Anxiety, Denies Depressed, Denies Emotional 

Problems


All Other Systems Reviewed


Negative Unless Noted:  Yes (Negative excepted noted.)





Physical Exam-General Problems


Physical Exam


Vital Signs





Vital Signs - First Documented








 4/27/21 4/27/21





 17:45 20:03


 


Temp 36.9 


 


Pulse 71 


 


Resp 18 


 


B/P (MAP) 120/76 (91) 


 


Pulse Ox 98 


 


O2 Delivery  Room Air





Capillary Refill : Less Than 3 Seconds


General Appearance:  WD/WN, no apparent distress


HEENT:  PERRL/EOMI, normal ENT inspection


Neck:  supple, other (Minimal tenderness cervical spine decent range of motion)


Respiratory:  chest non-tender, no respiratory distress, no accessory muscle use


Cardiovascular:  regular rate, rhythm, no JVD


Gastrointestinal:  non tender, soft, no organomegaly


Rectal:  deferred


Back:  muscle spasm, vertebral tenderness (Lumbar region)


Extremities:  no pedal edema; No inflammation, No swelling; other


Neurologic/Psychiatric:  no motor/sensory deficits, alert, normal mood/affect, 

oriented x 3


Skin:  normal color, warm/dry (Multiple superficial cuts diffusely)


Lymphatic:  no adenopathy





Data Review


Labs


Laboratory Tests


4/28/21 05:14: 


White Blood Count 11.4H, Red Blood Count 3.89L, Hemoglobin 12.3L, Hematocrit 39L

, Mean Corpuscular Volume 101H, Mean Corpuscular Hemoglobin 32, Mean Corpuscular

Hemoglobin Concent 32, Red Cell Distribution Width 14.4, Platelet Count 270, 

Mean Platelet Volume 10.6, Immature Granulocyte % (Auto) 0, Neutrophils (%) 

(Auto) 64, Lymphocytes (%) (Auto) 23, Monocytes (%) (Auto) 9, Eosinophils (%) 

(Auto) 4, Basophils (%) (Auto) 1, Neutrophils # (Auto) 7.2, Lymphocytes # (Auto)

2.6, Monocytes # (Auto) 1.0, Eosinophils # (Auto) 0.4H, Basophils # (Auto) 0.1, 

Immature Granulocyte # (Auto) 0.0, Acetaminophen Level < 10L





Assessment/Plan


Patient is fall off of ladder which was on 4/26/2021


Spinous process fracture L1-L2


Left transverse process fracture L2


Sacral fractures


Back pain


Marijuana/methamphetamine use





Patient is a 40-year-old male admitted for pain control.  His abdomen is soft I 

do not feel he has any abdominal process going on.  On CT scan he does have some

presacral space blood which I think is most likely related to his sacral 

fracture.


CT of the head and C-spine with no acute traumatic injury


Patient needs adequate pain control


Will obtain physical therapy to assist with ambulation.


No general surgical intervention needed at this time.











NOHEMI MARTINEZ DO              Apr 28, 2021 21:00

## 2021-04-28 NOTE — HISTORY & PHYSICAL
CARTER OJEDA,MED STUDENT 4/28/21 1202:


HPI


History of Present Illness:


He reports that he fell off his ladder on the evening of 4/26. Immediately 

after, he wasn't able to ambulate and felt like his legs were numb. His neighbor

helped him to bed, as he didn't want to come to the hospital. He says he took 

his remaining edibles to help with pain and to be able to sleep. He woke up the 

next morning and the numbness had subsided but he then felt increasing pain in i

n his neck, back and legs. He also had some oral pain from biting his tongue 

when he fell. He tried taking Ibuprofen at home for the pain, but realized it 

wasn't enough and had a friend drive him. Denies taking any Tylenol for pain. 

Today he continues to have pain in his neck/back/legs, but has some relief for a

short time with pain medications. He reports some numbness in his toes 

bilaterally, and on dorsum of foot. He is able to ambulate, although it is 

limited by pain.


Source:  patient


Exam Limitations:  no limitations


Attending Physician


Juan Carlos Cai MD


PCP


[facility] [place]


Consult





Date of Admission


Apr 27, 2021 at 19:13





Home Medications


Home Medications


Reviewed patient Home Medication Reconciliation performed by pharmacy medication

reconciliations technician and/or nursing.


Patients Allergies have been reviewed.





Allergies


Coded Allergies:  


     prednisone (Verified  Allergy, Unknown, 2/3/10)





PMH-Social-Family Hx


Patient Social History


Drug of Choice:  history of IV drug use, ACID


Smoking Status:  Current Everyday Smoker


2nd Hand Smoke Exposure:  Yes


Recent Hopitalizations:  No


Alcohol Use?:  Yes (1/2 pint 1-2x per week)


Substance type:  Amphetamines, Methamphetamine, Marijuana


Tobacco type used:  Cigarettes


Have you traveled recently?:  No





Immunizations Up To Date


Tetanus Booster (TDap):  Unknown





Past Medical History





ADHD/ADD


Bipolar


Anxiety


Depression


Hx IV Drug Use


Endocarditis


Multiple Fracture Hx





Family Medical History


Significant Family History:  No Pertinent Family Hx


Family History:  


Patient reports no known family medical history.





Review of Systems ([facility])


Constitutional:  No chills, No dizziness, No fever


EENTM:  No hearing loss, No vision loss, No nose pain, No throat pain


Respiratory:  No cough, No short of breath


Cardiovascular:  No chest pain, No edema


Gastrointestinal:  No abdominal pain, No constipation, No diarrhea, No nausea


Genitourinary:  no symptoms reported


Musculoskeletal:  back pain (lower back), neck pain


Skin:  other (superficial cuts on bilateral legs)


Psychiatric/Neurological:  Denies Headache; Numbness (mild numbness on bilateral

toes/dorsum of feet); Denies Tingling





Reviewed Test Results


Reviewed Test Results


Lab





Laboratory Tests








Test


 4/27/21


17:50 4/27/21


18:37 4/28/21


05:14 Range/Units


 


 


White Blood Count


 14.6 H


 


 11.4 H


 4.3-11.0


10^3/uL


 


Red Blood Count


 4.65 


 


 3.89 L


 4.30-5.52


10^6/uL


 


Hemoglobin 14.5   12.3 L 13.3-17.7  g/dL


 


Hematocrit 47   39 L 40-54  %


 


Mean Corpuscular Volume 100 H  101 H 80-99  fL


 


Mean Corpuscular Hemoglobin 31   32  25-34  pg


 


Mean Corpuscular Hemoglobin


Concent 31 L


 


 32 


 32-36  g/dL





 


Red Cell Distribution Width 14.4   14.4  10.0-14.5  %


 


Platelet Count


 334 


 


 270 


 130-400


10^3/uL


 


Mean Platelet Volume 10.6   10.6  9.0-12.2  fL


 


Immature Granulocyte % (Auto) 1   0   %


 


Neutrophils (%) (Auto) 82 H  64  42-75  %


 


Lymphocytes (%) (Auto) 10 L  23  12-44  %


 


Monocytes (%) (Auto) 6   9  0-12  %


 


Eosinophils (%) (Auto) 1   4  0-10  %


 


Basophils (%) (Auto) 0   1  0-10  %


 


Neutrophils # (Auto)


 12.0 H


 


 7.2 


 1.8-7.8


10^3/uL


 


Lymphocytes # (Auto)


 1.4 


 


 2.6 


 1.0-4.0


10^3/uL


 


Monocytes # (Auto)


 0.9 


 


 1.0 


 0.0-1.0


10^3/uL


 


Eosinophils # (Auto)


 0.1 


 


 0.4 H


 0.0-0.3


10^3/uL


 


Basophils # (Auto)


 0.0 


 


 0.1 


 0.0-0.1


10^3/uL


 


Immature Granulocyte # (Auto)


 0.1 


 


 0.0 


 0.0-0.1


10^3/uL


 


Neutrophils % (Manual) 85     %


 


Lymphocytes % (Manual) 10     %


 


Monocytes % (Manual) 5     %


 


Eosinophils % (Manual) 0     %


 


Basophils % (Manual) 0     %


 


Band Neutrophils 0     %


 


Blood Morphology Comment NORMAL     


 


Prothrombin Time 13.7    12.2-14.7  SEC


 


INR Comment 1.0    0.8-1.4  


 


Sodium Level 138    135-145  MMOL/L


 


Potassium Level 3.8    3.6-5.0  MMOL/L


 


Chloride Level 103      MMOL/L


 


Carbon Dioxide Level 25    21-32  MMOL/L


 


Anion Gap 10    5-14  MMOL/L


 


Blood Urea Nitrogen 12    7-18  MG/DL


 


Creatinine


 0.86 


 


 


 0.60-1.30


MG/DL


 


Estimat Glomerular Filtration


Rate > 60 


 


 


  





 


BUN/Creatinine Ratio 14     


 


Glucose Level 133 H     MG/DL


 


Calcium Level 10.1    8.5-10.1  MG/DL


 


Corrected Calcium 9.9    8.5-10.1  MG/DL


 


Total Bilirubin 0.6    0.1-1.0  MG/DL


 


Aspartate Amino Transf


(AST/SGOT) 121 H


 


 


 5-34  U/L





 


Alanine Aminotransferase


(ALT/SGPT) 116 H


 


 


 0-55  U/L





 


Alkaline Phosphatase 71      U/L


 


Total Protein 7.2    6.4-8.2  GM/DL


 


Albumin 4.3    3.2-4.5  GM/DL


 


Serum Alcohol < 10    <10  MG/DL


 


Urine Color  YELLOW    


 


Urine Clarity  CLEAR    


 


Urine pH  7.0   5-9  


 


Urine Specific Gravity  1.010 L  1.016-1.022  


 


Urine Protein  NEGATIVE   NEGATIVE  


 


Urine Glucose (UA)  NEGATIVE   NEGATIVE  


 


Urine Ketones  NEGATIVE   NEGATIVE  


 


Urine Nitrite  NEGATIVE   NEGATIVE  


 


Urine Bilirubin  NEGATIVE   NEGATIVE  


 


Urine Urobilinogen  0.2   < = 1.0  MG/DL


 


Urine Leukocyte Esterase  NEGATIVE   NEGATIVE  


 


Urine RBC (Auto)  NEGATIVE   NEGATIVE  


 


Urine RBC  NONE    /HPF


 


Urine WBC  NONE    /HPF


 


Urine Squamous Epithelial


Cells 


 NONE 


 


  /HPF





 


Urine Crystals  NONE    /LPF


 


Urine Bacteria  NEGATIVE    /HPF


 


Urine Casts  NONE    /LPF


 


Urine Mucus  NEGATIVE    /LPF


 


Urine Culture Indicated  NO    


 


Urine Opiates Screen  NEGATIVE   NEGATIVE  


 


Urine Oxycodone Screen  NEGATIVE   NEGATIVE  


 


Urine Methadone Screen  NEGATIVE   NEGATIVE  


 


Urine Propoxyphene Screen  NEGATIVE   NEGATIVE  


 


Urine Barbiturates Screen  NEGATIVE   NEGATIVE  


 


Ur Tricyclic Antidepressants


Screen 


 NEGATIVE 


 


 NEGATIVE  





 


Urine Phencyclidine Screen  NEGATIVE   NEGATIVE  


 


Urine Amphetamines Screen  POSITIVE H  NEGATIVE  


 


Urine Methamphetamines Screen  NEGATIVE   NEGATIVE  


 


Urine Benzodiazepines Screen  NEGATIVE   NEGATIVE  


 


Urine Cocaine Screen  NEGATIVE   NEGATIVE  


 


Urine Cannabinoids Screen  POSITIVE H  NEGATIVE  


 


Acetaminophen Level   < 10 L 10-30  UG/ML








Radiology


Date of Exam:04/27/21





CT CHEST/ABDOMEN/PELVIS W








PROCEDURE: CT chest, abdomen, and pelvis with contrast.





TECHNIQUE: Multiple contiguous axial images were obtained through


the chest, abdomen, and pelvis after the administration of


intravenous contrast. Auto Exposure Controls were utilized during


the CT exam to meet ALARA standards for radiation dose reduction.








INDICATION:  Fall with back pain and tailbone pain.





Correlation is made with prior CT from 10/04/2020.





CT CHEST:





No definite mediastinal hematoma or great vessel injury is seen.


There is no pericardial or pleural fluid identified. There appear


to be some patchy infiltrates or atelectasis in the posterior


lung bases. Bony structures appear intact.





IMPRESSION: Bibasilar atelectasis or infiltrate. No other


significant abnormality in the chest is identified.





CT abdomen and pelvis:





No focal liver or splenic laceration is seen. The gallbladder is


unremarkable. Pancreas unremarkable. No adrenal or renal injury


is seen. Aorta is unremarkable. Bladder is moderately distended.


There is some minimal free fluid in the dependent portion of the


pelvis which does show some increased density. Small amount of


hemoperitoneum cannot be entirely excluded. There are fractures


involving the spinous processes at L1 and L2. A right transverse


process fracture at L3 was present on prior exam. There is a new


left transverse process fracture at L2. There does appear to be a


fracture left para-midline of the sacrum. There is also


transversely oriented lucency in the region of the right sacral


ala suspicious for a fracture. SI joints are not widened. No


other fractures are seen.





IMPRESSION:


1. No evidence of abdominal or pelvic visceral injury.


2. There are several fractures present, as described. In


particular, the spinous processes at L1-L2 as well as left


transverse process fracture at L2. There appear to be sacral


fractures present as well. There is a probable trace amount of


blood in the presacral space.








Date of Exam:04/27/21





CT HEAD/CERVICAL SPINE WO








PROCEDURE: CT head and CT cervical spine without contrast.





TECHNIQUE: Multiple contiguous axial images were obtained through


the brain and cervical spine without the use of intravenous


contrast. Sagittal and coronal reformations through the cervical


spine were then performed. Auto Exposure Controls were utilized


during the CT exam to meet ALARA standards for radiation dose


reduction. 





INDICATION: Fall, head and neck pain.





Study compared 04/23/2021.





Head: The exam is stable and normal. No hemorrhage,


hydrocephalus, edema, mass, mass effect or fracture. No


hemo-sinus. No change.





Cervical spine: Degenerative changes to the cervical spine


greatest at C5-C6 and C6-C7 chronic. No cervical fracture. No


paraspinal hemorrhage. No facet joint dislocation. No change in


alignment.





IMPRESSION: Stable unremarkable CT head and cervical spine showed


no acute or posttraumatic sequelae.


Date of Exam:04/28/21





US VENOUS LOWER EXT NAYA








PROCEDURE: 


US Venous Lower Ext Naya.





TECHNIQUE: 


Multiple Real-time grayscale images were obtained over the lower


extremities in various projections, bilaterally. Additional


duplex Doppler and color Doppler images were also obtained.





INDICATION: 


Bilateral lower extremity pain.





EXAMINATIONS: 


Both grayscale and color Doppler imaging of the deep veins of the


lower extremities was performed with waveform analysis.





FINDINGS: 


There is no intraluminal filling defect. Normal continuous flow


is seen throughout the deep venous systems of both legs and there


is normal response to augmentation. The deep veins compress


normally.





IMPRESSION: 


No ultrasound evidence of deep venous thrombosis in either lower


extremity.





Physical Exam-([facility])


Physical Exam


Vital Signs





                          VS - Last 72 Hours, by Label








 4/27/21 4/27/21 4/27/21 4/27/21





 17:45 20:03 20:50 21:00


 


Temp 36.9 36.9 36.6 


 


Pulse 71 76 71 


 


Resp 18 19 20 


 


B/P (MAP) 120/76 (91) 15/72 (91) 109/56 (73) 


 


Pulse Ox 98 98 96 


 


O2 Delivery  Room Air Room Air Room Air


 


    





 4/28/21 4/28/21 4/28/21 4/28/21





 00:23 00:26 02:58 03:41


 


Temp  36.5  36.6


 


Pulse  63  57


 


Resp  22  19


 


B/P (MAP)  106/63 (77)  100/65 (77)


 


Pulse Ox 94 96 97 99


 


O2 Delivery Room Air Room Air Room Air Room Air


 


    





 4/28/21 4/28/21 4/28/21 





 08:13 10:54 11:51 


 


Temp 36.1  36.4 


 


Pulse 66  76 


 


Resp 20  20 


 


B/P (MAP) 105/67 (80)  133/74 (93) 


 


Pulse Ox 97 100 90 


 


O2 Delivery Room Air Room Air Room Air 





Capillary Refill : Less Than 3 Seconds


General Appearance:  other (appears uncomfortable, in pain)


Eyes:  Bilateral Eye Normal Inspection


HEENT:  No scleral icterus (R), No scleral icterus (L)


Neck:  limited range of motion (movement limited by pain)


Respiratory:  lungs clear, normal breath sounds, no respiratory distress


Cardiovascular:  regular rate, rhythm, no edema, no JVD


Gastrointestinal:  normal bowel sounds, non tender, soft


Back:  decreased range of motion (ROM limited by pain), other (pain on palpation

of lower back)


Extremities:  calf tenderness (bilateral calf pain, no overlying erythema)


Neurologic/Psychiatric:  alert, oriented x 3, other (some mild loss of touch 

sensation in toes and dorsum of both feet, no change in 

temperature/proprioception)


Skin:  other (multiple superficial lacerations on bilateral legs)


Lymphatic:  no adenopathy





Assessment/Plan


Assessment/Plan


Assessment & Plan


Carter Eng is a 40y M who presented to the hospital after a 20ft fall and was 

found to have L1/L2 spinous process fractures, sacral fracture, as well as some 

free fluid in the pelvis (with concern for possible hemoperitoneum). He was 

found to have a leukocytosis on admission, that is most likely reactionary. He 

was also found to have elevated liver transaminases, with no clear etiology. 

Currently no acute interventions for his injury, pain being treated with 

multiple medications, and PT to work with patient on ambulating as tolerated.





(1) Closed fracture of spinous process of lumbar vertebra


Status:  Acute


Assessment & Plan:  Trauma surgery following, no acute interventions at this 

time. Patient to work with PT on ambulation. 





(2) Elevated liver enzymes


Status:  Acute


Assessment & Plan:  Hepatitis panel ordered, will continue to trend. Denies any 

Tylenol use. 





(3) Intractable pain


Status:  Acute


Assessment & Plan:  Dilaudid and Hydromorphone/Acetaminophen prn for pain





(4) Closed fracture of transverse process of lumbar vertebra


Status:  Acute


Assessment & Plan:  Trauma surgery following, no acute interventions at this 

time. Patient to work with PT on ambulation. 





(5) Hemoperitoneum


Status:  Acute


Assessment & Plan:  Trauma surgery following, no acute interventions at this 

time. Will continue to monitor vitals and hemoglobin to assess for any continued

bleeding. 





(6) Sacral fracture, closed


Status:  Acute


Assessment & Plan:  Trauma surgery following, no acute interventions at this 

time. Patient to work with PT on ambulation. 








JUAN CARLOS CAI MD 4/28/21 1448:


HPI


History of Present Illness:


Time Seen by Provider:  10:40





Home Medications


Allergies


Coded Allergies:  


     prednisone (Verified  Allergy, Unknown, 2/3/10)





PMH-Social-Family Hx


Family Medical History


Family History:  


Patient reports no known family medical history.





Assessment/Plan


Assessment/Plan


Admission Status:  Inpatient Order (span 2 midnights)


Reason for Inpatient Admission:  


Multiple significant injuries and possible hemoperitoneum.





Supervisory-Addendum Brief


Verification & Attestation


Participated in pt care:  history, MDM, physical


Personally performed:  exam, history, MDM


Care discussed with:  Medical Student


Procedures:  n/a


I personally saw and examined patient and did my own history and physical exam 

which match that documented by the medical student. I did not confirm the 

abdominal exam findings. Will hold pharmacologic DVT prophylaxis due to possible

hemoperitoneum. I directed the plan of care as documented.











CARTER OJEDA,MED STUDENT     Apr 28, 2021 12:02


JUAN CARLOS CAI MD             Apr 28, 2021 14:48

## 2021-04-28 NOTE — PHYSICAL THERAPY EVALUATION
PT Evaluation-General


Medical Diagnosis


Admission Date


Apr 27, 2021 at 19:13


Medical Diagnosis:  lumbar/sacral fx


Onset Date:  Apr 27, 2021





Therapy Diagnosis


Therapy Diagnosis:  decreased mobility





Height/Weight


Height (Feet):  6


Height (Inches):  1


Weight (Pounds):  188


Weight (Ounces):  6.4





Precautions


Precautions/Isolations:  Fall Prevention, Standard Precautions





Weight Bear Status


Weight Bearing/Tolerated


Weight Bearing/Tolerated





Referral


Physician:  Trell


Reason for Referral:  Evaluation/Treatment





Medical History


Pertinent Medical History:  Smoking


Additional Medical History


Surgeries:  Yes (LT KNEE AND QUAD, RT SHOULDER, RT KNEE, LT WRIST, JAW 

RECONSTRUCTION)


Orthopedic


Respiratory:  No


Cardiac:  Yes


Endocarditis


Neurological:  No


Reproductive Disorders:  No


Gastrointestinal:  No


Musculoskeletal:  Yes


Fractures


Endocrine:  No


HEENT:  No


Cancer:  No


Psychosocial:  Yes


ADD/ADHD, Anxiety, Bipolar, Depression


Integumentary:  No


Blood Disorders:  No


Current History


to ED after 20 ft fall off ladder


Reviewed History:  Yes





Social History


Home:  Single Level


Current Living Status:  Alone


Entry Into Home:  Level Entry





Prior


Prior Level of Function


SCALE: Activities may be completed with or without assistive devices.





6-Indepedent-patient completes the activity by him/herself with no assistance 

from a helper.


5-Set-up or Clean-up Assistance-helper sets up or cleans up; patient completes 

activity. [place] assists only prior to or  


    following the activity.


4-Supervision or Touching Assistance-helper provides verbal cues and/or 

touching/steadying and/or contact guard assistance as patient completes 

activity. Assistance may be provided   


    throughout the activity or intermittently.


3-Partial/Moderate Assistance-helper does LESS THAN HALF the effort. [place] 

lifts, holds or supports trunk or limbs, but provides less than half the effort.


2-Substantial/Maximal Assistance-helper does MORE THAN HALF the effort. [place] 

lifts or holds trunk or limbs and provides more than half the effort.


6-Wshfsycty-ryxuyp does ALL the effort. Patient does none of the effort to 

complete the activity. Or, the assistance of 2 or more helpers is required for 

the patient to complete the  


    activity.


If activity was not attempted, code reason:


7-Patient Refused.


9-Not Applicable-not attempted and the patient did not perform the activity 

before the current illness, exacerbation or injury.


10-Not Attempted due to Environmental Limitations-(lack of equipment, weather 

restraints, etc.).


88-Not Attempted due to Medical Conditions or Safety Concerns.


Bed Mobility:  6


Transfers (B,C,W/C):  6


Gait:  6


Stairs:  6


Indoor Mobility (Ambulation):  Independent


Stairs:  Independent


Prior Devices Use:  None





PT Evaluation-Current


Subjective


Patient reports 9/10 pain in back, supine pre tx, motivated to participate in 

therapy. Patient began to cry before PT began, and throughout session.





Pt/Family Goals


Return home to [facility]





Objective


Patient Orientation:  Person, Place, Time, Situation, Normal For Age


Attachments:  IV





ROM/Strength


ROM Lower Extremities


WFL


Strength Lower Extremities


Could not perform MMT secondary to pain complaints





Integumentary/Posture


Integumentary


see nursing report


Bowel Incontinence:  No


Bladder Incontinence:  No


Posture


mild kyphosis





Sensory


Vision:  Functional


Hearing:  Functional


Sensation Right Lower Extremit:  Intact


Sensation Left Lower Extremity:  Intact





Transfers


Roll Left to Right (QC):  6


Sit to Lying (QC):  3


Lying to Sitting/Side of Bed(Q:  3


Sit to Stand (QC):  3


Min to Mod assist. Patient utilized assistance with maneuvering legs in/out of 

bed, required mod A for sit <-> stand and supine <-> sit. Patient was able to 

stand for ~30 seconds before requesting to sit back down, was able to perform 

stand <-> sit with CGA, but had intense pain complaints with all transfers/bed 

mobility.





Gait


Does the Patient Walk?:  No and Walking Goal IS indicated





Balance


Sitting Static:  Good


Sitting Dynamic:  Good


Standing Static:  Good


 Standing Dynamic:  Good





Treatment


Instructed patient to complete as many reps of ankle pumps and heel slides as 

pain-tolerated to prevent DVTs and encourage blood circulation while in bed.





Assessment/Needs


Patient is limited in activity by pain, but is motivated to participate.


Rehab Potential:  Fair





PT Long Term Goals


Long Term Goals


PT Long Term Goals Time Frame:  May 5, 2021


Roll Left & Right (QC):  6


Sit to Lying (QC):  6


Lying-Sitting on Side/Bed(QC):  6


Sit to Stand (QC):  6


Chair/Bed-to-Chair Xfer(QC):  6


Walk 10 feet (QC):  6


Walk 50ft with 2 Turns (QC):  6


Walk 150 ft (QC):  6





PT Plan


Problem List


Problem List:  Activity Tolerance, Functional Strength, Safety, Balance, Gait, 

Transfer, Bed Mobility, ROM





Treatment/Plan


Treatment Plan:  Continue Plan of Care


Treatment Plan:  Bed Mobility, Education, Functional Activity Mariya, Functional 

Strength, Gait, Safety, Therapeutic Exercise, Transfers


Treatment Duration:  May 5, 2021


Frequency:  6 times per week


Estimated Hrs Per Day:  .25 hour per day


Patient and/or Family Agrees t:  Yes





Safety Risks/Education


Patient Education:  Gait Training, Transfer Techniques, Correct Positioning, 

Safety Issues


Teaching Recipient:  Patient


Teaching Methods:  Demonstration, Discussion


Response to Teaching:  Verbalize Understanding, Return Demonstration





Discharge Recommendations


Plan


Incorporate functional strengthening, endurance, transfer and gait training to 

promote optimal safety with return home.





Time/GCodes


Time In:  0905


Time Out:  0918


Total Billed Treatment Time:  13


Total Billed Treatment


1 visit: 


EVM: MARTY MORA PT                Apr 28, 2021 09:27

## 2021-04-28 NOTE — DIAGNOSTIC IMAGING REPORT
PROCEDURE: 

US Venous Lower Ext Fritz.



TECHNIQUE: 

Multiple Real-time grayscale images were obtained over the lower

extremities in various projections, bilaterally. Additional

duplex Doppler and color Doppler images were also obtained.



INDICATION: 

Bilateral lower extremity pain.



EXAMINATIONS: 

Both grayscale and color Doppler imaging of the deep veins of the

lower extremities was performed with waveform analysis.



FINDINGS: 

There is no intraluminal filling defect. Normal continuous flow

is seen throughout the deep venous systems of both legs and there

is normal response to augmentation. The deep veins compress

normally.



IMPRESSION: 

No ultrasound evidence of deep venous thrombosis in either lower

extremity.





Dictated by: 



  Dictated on workstation # [id number]

## 2021-04-29 VITALS — SYSTOLIC BLOOD PRESSURE: 134 MMHG | DIASTOLIC BLOOD PRESSURE: 82 MMHG

## 2021-04-29 VITALS — DIASTOLIC BLOOD PRESSURE: 74 MMHG | SYSTOLIC BLOOD PRESSURE: 119 MMHG

## 2021-04-29 VITALS — SYSTOLIC BLOOD PRESSURE: 125 MMHG | DIASTOLIC BLOOD PRESSURE: 72 MMHG

## 2021-04-29 VITALS — DIASTOLIC BLOOD PRESSURE: 65 MMHG | SYSTOLIC BLOOD PRESSURE: 115 MMHG

## 2021-04-29 LAB
ALBUMIN SERPL-MCNC: 3.5 GM/DL (ref 3.2–4.5)
ALP SERPL-CCNC: 53 U/L (ref 40–136)
ALT SERPL-CCNC: 83 U/L (ref 0–55)
BILIRUB SERPL-MCNC: 0.3 MG/DL (ref 0.1–1)
BUN/CREAT SERPL: 16
CALCIUM SERPL-MCNC: 8.9 MG/DL (ref 8.5–10.1)
CHLORIDE SERPL-SCNC: 106 MMOL/L (ref 98–107)
CO2 SERPL-SCNC: 26 MMOL/L (ref 21–32)
CREAT SERPL-MCNC: 0.85 MG/DL (ref 0.6–1.3)
GFR SERPLBLD BASED ON 1.73 SQ M-ARVRAT: > 60 ML/MIN
GLUCOSE SERPL-MCNC: 95 MG/DL (ref 70–105)
HCT VFR BLD CALC: 38 % (ref 40–54)
HGB BLD-MCNC: 11.9 G/DL (ref 13.3–17.7)
MCH RBC QN AUTO: 32 PG (ref 25–34)
MCHC RBC AUTO-ENTMCNC: 32 G/DL (ref 32–36)
MCV RBC AUTO: 100 FL (ref 80–99)
PLATELET # BLD: 264 10^3/UL (ref 130–400)
PMV BLD AUTO: 11 FL (ref 9–12.2)
POTASSIUM SERPL-SCNC: 4.2 MMOL/L (ref 3.6–5)
PROT SERPL-MCNC: 5.8 GM/DL (ref 6.4–8.2)
SODIUM SERPL-SCNC: 138 MMOL/L (ref 135–145)
WBC # BLD AUTO: 11.1 10^3/UL (ref 4.3–11)

## 2021-04-29 RX ADMIN — SODIUM CHLORIDE, SODIUM LACTATE, POTASSIUM CHLORIDE, AND CALCIUM CHLORIDE SCH MLS/HR: 600; 310; 30; 20 INJECTION, SOLUTION INTRAVENOUS at 08:05

## 2021-04-29 RX ADMIN — KETOROLAC TROMETHAMINE PRN MG: 30 INJECTION, SOLUTION INTRAMUSCULAR; INTRAVENOUS at 03:50

## 2021-04-29 RX ADMIN — DOCUSATE SODIUM SCH MG: 50 LIQUID ORAL at 08:07

## 2021-04-29 RX ADMIN — HYDROMORPHONE HYDROCHLORIDE PRN MG: 2 INJECTION, SOLUTION INTRAMUSCULAR; INTRAVENOUS; SUBCUTANEOUS at 09:32

## 2021-04-29 RX ADMIN — SODIUM CHLORIDE, SODIUM LACTATE, POTASSIUM CHLORIDE, AND CALCIUM CHLORIDE SCH MLS/HR: 600; 310; 30; 20 INJECTION, SOLUTION INTRAVENOUS at 06:10

## 2021-04-29 RX ADMIN — HYDROMORPHONE HYDROCHLORIDE PRN MG: 2 INJECTION, SOLUTION INTRAMUSCULAR; INTRAVENOUS; SUBCUTANEOUS at 12:53

## 2021-04-29 NOTE — DISCHARGE SUMMARY
CARTER OJEDA,MED STUDENT 4/29/21 1143:


Discharge Summary


Hospital Course


Problems/Diagnosis:  


(1) Closed fracture of spinous process of lumbar vertebra


Status:  Acute


Assessment & Plan:  Surgery consulted, no acute interventions were needed during

his hospitalization. Patient to work with PT outpatient. 





(2) Elevated liver enzymes


Status:  Acute


Assessment & Plan:  Liver transaminases elevated upon admission. Downtrend today

(AST from 121 to 51,  to 83). No clear etiology, hepatitis panel negative

and low Tylenol level (with no reported ingestion), denied recent alcohol use.  





(3) Intractable pain


Status:  Acute


Assessment & Plan:  Pain relatively controlled with Oxycodone and Toradol, plan 

to discharge with pain medication for prn use at home. 





(4) Closed fracture of transverse process of lumbar vertebra


Status:  Acute


Assessment & Plan:  Surgery consulted, no acute interventions were needed during

his hospitalization. Patient to work with PT outpatient. 





(5) Hemoperitoneum


Status:  Acute


Assessment & Plan:  Surgery consulted, no acute interventions were needed during

his hospitalization. Hemoglobin relatively stable during admission, no 

signs/symptoms of continued bleeding. 





(6) Sacral fracture, closed


Status:  Acute


Assessment & Plan:  Surgery consulted, no acute interventions were needed during

his hospitalization. Patient to work with PT outpatient.  





Hospital Course


Date of Admission: Apr 27, 2021 at 19:13 


Admission Diagnosis :  





[facility]  





Date of Discharge: 4/29/21 


Discharge Diagnosis: Spinous process fracture L1-L2, Left transverse process 

fracture L2, Sacral fractures, Elevated Liver Transaminases








Hospital Course:


Carter Eng is a 40y M that was admitted following a 20 foot fall off a ladder. 

After his fall he had numbness/weakness in his lower extremities, took edibles 

to fall asleep and woke up the next morning in increasing pain. He presented to 

the hospital the day after the fall, and was found to have fractures at the L1-

L2 spinous process and transverse process, as well as sacral fractures. Free 

fluid was noted on CT in the pelvis, with possible hemoperitoneum due to his 

fall and sacral fracture. On admission, his WBC count was noted to be elevated, 

likely reactionary to his trauma. Liver transaminases were also elevated upon 

admission with no clear etiology (denied alcohol/Tylenol, Hepatitis panel 

negative), but downtrending on discharge. 





No acute surgical interventions were needed during his hospitalization. No signs

of expanding hemoperitoneum symptomatically, with hemoglobin remaining 

relatively stable during admission. Lower back, neck and calf pain were 

addressed during admission. Upon day of discharge, pain was well controlled with

Oxycodone and Toradol. Lower extremity doppler was done due to pain in lower 

extremities, with no signs of DVT. 





His home medications for Bipolar/ADHD/Depression/Anxiety were continued 

(Lithium, Vyvanse, Seroquel, Remeron) during the hospitalization. 





Patient to work with PT as an outpatient on mobility. Will be sent home with 

pain medication to take as needed. 








Labs and Pending Lab Test:


Laboratory Tests


4/29/21 05:15: 


White Blood Count 11.1H, Red Blood Count 3.77L, Hemoglobin 11.9L, Hematocrit 38L

, Mean Corpuscular Volume 100H, Mean Corpuscular Hemoglobin 32, Mean Corpuscular

Hemoglobin Concent 32, Red Cell Distribution Width 14.1, Platelet Count 264, 

Mean Platelet Volume 11.0, Sodium Level 138, Potassium Level 4.2, Chloride Level

106, Carbon Dioxide Level 26, Anion Gap 6, Blood Urea Nitrogen 14, Creatinine 

0.85, Estimat Glomerular Filtration Rate > 60, BUN/Creatinine Ratio 16, Glucose 

Level 95, Calcium Level 8.9, Corrected Calcium 9.3, Total Bilirubin 0.3, 

Aspartate Amino Transf (AST/SGOT) 51H, Alanine Aminotransferase (ALT/SGPT) 83H, 

Alkaline Phosphatase 53, Total Protein 5.8L, Albumin 3.5





Home Meds


Active


Reported


Vyvanse (Lisdexamfetamine Dimesylate) 60 Mg Capsule 60 Mg PO DAILY


Seroquel (Quetiapine Fumarate) 300 Mg Tablet 300 Mg PO HS


Mirtazapine 30 Mg Tablet 30 Mg PO HS


Lithium Carbonate 600 Mg Capsule 1,200 Mg PO HS


     TAKES 2 (600MG) CAPS


Lithium Carbonate 600 Mg Capsule 600 Mg PO DAILY


Assessment/Pt DC Instructions


Patient to be sent home with pain medication to take as needed, as well as a 

referral to work with PT on mobility as an outpatient.





Consulations


Consultations


Surgery consulted, no acute interventions needed during hospitalization.





Discharge Physical Examination


Allergies:  


Coded Allergies:  


     prednisone (Verified  Allergy, Unknown, 2/3/10)


General Appearance:  No Apparent Distress


HEENT:  PERRL/EOMI


Respiratory:  Lungs Clear, Normal Breath Sounds


Cardiovascular:  Regular Rate, Rhythm, No Edema, No JVD


Gastrointestinal:  Normal Bowel Sounds, Tenderness (mild tenderness to palpati

on)


Extremity:  Normal Inspection, Normal Range of Motion, No Pedal Edema


Skin:  Normal Color, Warm/Dry


Neurologic/Psychiatric:  Alert, Oriented x3





Discharge Summary


Date of Admission


Apr 27, 2021 at 19:13


Date of Discharge








JUAN CARLOS WRIGHT MD 4/29/21 1502:


Discharge Summary


Discharge Physical Examination


Allergies:  


Coded Allergies:  


     prednisone (Verified  Allergy, Unknown, 2/3/10)





Supervisory-Addendum Brief


Verification & Attestation


Participated in pt care:  history, MDM, physical


Personally performed:  exam, history, MDM


Care discussed with:  Medical Student


Procedures:  n/a


I personally saw and examined this patient today and agree with documentation by

medical student. Offered walker and PT, but patient declined.











CARTER OJEDA,MED STUDENT     Apr 29, 2021 11:43


JUAN CARLOS WRIGHT MD             Apr 29, 2021 15:02

## 2021-04-29 NOTE — DISCHARGE SUMMARY
Discharge [facility]


Discharge Medications


New, Converted or Re-Newed RX:  RX on Chart


New Medications:  


Ibuprofen (Ibuprofen) 800 Mg Tablet


800 MG PO Q8H PRN for PAIN-MILD, #60 TAB 0 Refills





Oxycodone Hcl (Oxyir Tablet) 5 Mg Tab


5 MG PO Q4H PRN for PAIN-SEVERE (8-10), #15 TAB 0 Refills





 


Continued Medications:  


Lisdexamfetamine Dimesylate (Vyvanse) 60 Mg Capsule


60 MG PO DAILY, CAP





Lithium Carbonate (Lithium Carbonate) 600 Mg Capsule


600 MG PO DAILY, CAP





Lithium Carbonate (Lithium Carbonate) 600 Mg Capsule


1200 MG PO HS, CAP


TAKES 2 (600MG) CAPS


Mirtazapine (Mirtazapine) 30 Mg Tablet


30 MG PO HS, TAB





Quetiapine Fumarate (Seroquel) 300 Mg Tablet


300 MG PO HS, TAB











Patient Instructions


Goal/Follow Up Appt:  


Follow up with Dr. Arriaga at [facility] on 5/6 at 10:20 am.


Patient Instructions:  


Order placed for walker on discharge.


Return to The Hospital For:  


Worsening abdominal pain





Activity & Diet


Discharge Diet:  No Restrictions


Activity as Tolerated:  Yes











JUAN CARLOS WRIGHT MD             Apr 29, 2021 11:50

## 2021-04-29 NOTE — PHYSICAL THERAPY DAILY NOTE
PT Daily Note-Current


Subjective


Patient reluctantly agrees to PT.  10/10 LBP with meds issued prior to PT.





Pain





   Numeric Pain Scale:  10-Worst Possible Pain


   Location:  Lower


   Location Body Site:  Back


   Pain Description:  Acute





Mental Status


Patient Orientation:  Normal For Age


Attachments:  IV





Transfers


SCALE: Activities may be completed with or without assistive devices.





6-Indepedent-patient completes the activity by him/herself with no assistance 

from a helper.


5-Set-up or Clean-up Assistance-helper sets up or cleans up; patient completes 

activity. Murray assists only prior to or  


    following the activity.


4-Supervision or Touching Assistance-helper provides verbal cues and/or 

touching/steadying and/or contact guard assistance as patient completes 

activity. Assistance may be provided   


    throughout the activity or intermittently.


3-Partial/Moderate Assistance-helper does LESS THAN HALF the effort. Murray 

lifts, holds or supports trunk or limbs, but provides less than half the effort.


2-Substantial/Maximal Assistance-helper does MORE THAN HALF the effort. Murray 

lifts or holds trunk or limbs and provides more than half the effort.


6-Bkdjvvkjx-ihanyc does ALL the effort. Patient does none of the effort to 

complete the activity. Or, the assistance of 2 or more helpers is required for 

the patient to complete the  


    activity.


If activity was not attempted, code reason:


7-Patient Refused.


9-Not Applicable-not attempted and the patient did not perform the activity 

before the current illness, exacerbation or injury.


10-Not Attempted due to Environmental Limitations-(lack of equipment, weather 

restraints, etc.).


88-Not Attempted due to Medical Conditions or Safety Concerns.


Roll Left & Right (QC):  6


Sit to Lying (QC):  6


Lying to Sitting/Side of Bed(Q:  6


Sit to Stand (QC):  4





Weight Bearing


Weight Bearing/Tolerated


Weight Bearing/Tolerated





Gait Training


Does the Patient Walk?:  Yes


Distance:  200'


Walk 10 feet (QC):  4


Walk 50 ft with 2 Turns(QC):  4


Walk 150 ft (QC):  4


Gait Assistive Device:  FWW


slow, functional gait sequence





Assessment


Patient ceases treatment due to increase LBP.  Patient returned to bed.





PT Long Term Goals


Long Term Goals


PT Long Term Goals Time Frame:  May 5, 2021


Roll Left & Right (QC):  6


Sit to Lying (QC):  6


Lying-Sitting on Side/Bed(QC):  6


Sit to Stand (QC):  6


Chair/Bed-to-Chair Xfer(QC):  6


Walk 10 feet (QC):  6


Walk 50ft with 2 Turns (QC):  6


Walk 150 ft (QC):  6





PT Plan


Treatment/Plan


Treatment Plan:  Continue Plan of Care


Treatment Plan:  Bed Mobility, Education, Functional Activity Mariya, Functional 

Strength, Gait, Safety, Therapeutic Exercise, Transfers


Treatment Duration:  May 5, 2021


Frequency:  6 times per week


Estimated Hrs Per Day:  .25 hour per day


Patient and/or Family Agrees t:  Yes





Time/GCodes


Time In:  906


Time Out:  923


Total Billed Treatment Time:  17


Total Billed Treatment


1 visit


FA 17 min











ISABEL ROA PT              Apr 29, 2021 10:02

## 2021-04-29 NOTE — PROGRESS NOTE - SURGERY
Subjective


Date Seen by a Provider:  Apr 29, 2021


Time Seen by a Provider:  10:35


Subjective/Events-last exam


Patient states feeling a little bit better today.  Patient was going to leave 

[facility] last night.  Patient still with pain in the lower back.  Has sensation and 

motor to lower extremities without difficulty.  He is tolerating diet.  He is 

not having nausea or vomiting.  Denies any fever sweats chills shortness of 

breath or chest pain.





Objective


Exam





Vital Signs








  Date Time  Temp Pulse Resp B/P (MAP) Pulse Ox O2 Delivery O2 Flow Rate FiO2


 


4/29/21 11:56 36.6 63 18 119/74 (89) 98 Room Air  


 


4/29/21 10:49     100 Room Air  


 


4/29/21 08:00     100 Room Air  


 


4/29/21 07:55 36.8 62 18 125/72 (89) 100 Room Air  


 


4/29/21 06:58     100 Room Air  


 


4/29/21 03:51 36.9 65 16 115/65 (82) 98 Room Air  


 


4/29/21 02:34     99 Room Air  


 


4/28/21 23:15 36.8 77 16 104/56 (72) 100 Room Air  


 


4/28/21 22:18     99 Room Air  


 


4/28/21 19:42 36.6 62 16 111/64 (80) 100 Room Air  


 


4/28/21 16:08 37.0 56 17 106/95 (99) 97 Room Air  


 


4/28/21 15:34     99 Room Air  














I & O 


 


 4/29/21





 07:00


 


Intake Total 3510 ml


 


Balance 3510 ml





Capillary Refill : Less Than 3 Seconds


General Appearance:  No Apparent Distress


HEENT:  PERRL/EOMI


Neck:  Full Range of Motion, Normal Inspection


Respiratory:  Chest Non Tender, No Accessory Muscle Use, No Respiratory Distress


Cardiovascular:  Regular Rate, Rhythm, No Edema, No JVD


Gastrointestinal:  non tender, soft, no organomegaly


Extremity:  Normal Inspection, Normal Range of Motion, No Pedal Edema


Neurologic/Psychiatric:  Alert, Oriented x3, No Motor/Sensory Deficits, Normal 

Mood/Affect, CNs II-XII Norm as Tested


Skin:  Normal Color, Warm/Dry


Lymphatic:  No Adenopathy


Other comments


Low back still with tenderness and increased muscular tone.  Better than 

yesterday though.





Results


Lab


Laboratory Tests


4/29/21 05:15: 


White Blood Count 11.1H, Red Blood Count 3.77L, Hemoglobin 11.9L, Hematocrit 38L

, Mean Corpuscular Volume 100H, Mean Corpuscular Hemoglobin 32, Mean Corpuscular

Hemoglobin Concent 32, Red Cell Distribution Width 14.1, Platelet Count 264, 

Mean Platelet Volume 11.0, Sodium Level 138, Potassium Level 4.2, Chloride Level

106, Carbon Dioxide Level 26, Anion Gap 6, Blood Urea Nitrogen 14, Creatinine 

0.85, Estimat Glomerular Filtration Rate > 60, BUN/Creatinine Ratio 16, Glucose 

Level 95, Calcium Level 8.9, Corrected Calcium 9.3, Total Bilirubin 0.3, 

Aspartate Amino Transf (AST/SGOT) 51H, Alanine Aminotransferase (ALT/SGPT) 83H, 

Alkaline Phosphatase 53, Total Protein 5.8L, Albumin 3.5





Assessment/Plan


Patient is fall off of ladder which was on 4/26/2021


Spinous process fracture L1-L2


Left transverse process fracture L2


Sacral fractures


Back pain


Marijuana/methamphetamine use





Patient getting back brace and a walker for support.  Patient has no deficits at

this time.  Pain control


No general surgical intervention needed at this time.











NOHEMI MARTINEZ DO              Apr 29, 2021 13:13

## 2021-04-29 NOTE — PHYSICAL THERAPY PROGRESS NOTE
Therapy Progress Note


PT issued LSO prior to dismissal from hospital.  PT educated and demonstrated 

donning brace with patient able to perform.  RN notified.


1 visit











ISABEL ROA PT              Apr 29, 2021 15:05

## 2021-06-25 ENCOUNTER — HOSPITAL ENCOUNTER (EMERGENCY)
Dept: HOSPITAL 75 - ER | Age: 41
Discharge: HOME | End: 2021-06-25
Payer: SELF-PAY

## 2021-06-25 VITALS — DIASTOLIC BLOOD PRESSURE: 82 MMHG | SYSTOLIC BLOOD PRESSURE: 143 MMHG

## 2021-06-25 VITALS — HEIGHT: 71.65 IN | BODY MASS INDEX: 24.76 KG/M2 | WEIGHT: 180.78 LBS

## 2021-06-25 DIAGNOSIS — F31.9: ICD-10-CM

## 2021-06-25 DIAGNOSIS — F41.9: ICD-10-CM

## 2021-06-25 DIAGNOSIS — L02.811: Primary | ICD-10-CM

## 2021-06-25 DIAGNOSIS — Z79.899: ICD-10-CM

## 2021-06-25 DIAGNOSIS — F17.210: ICD-10-CM

## 2021-06-25 PROCEDURE — 87077 CULTURE AEROBIC IDENTIFY: CPT

## 2021-06-25 PROCEDURE — 87070 CULTURE OTHR SPECIMN AEROBIC: CPT

## 2021-06-25 PROCEDURE — 87205 SMEAR GRAM STAIN: CPT

## 2021-06-25 PROCEDURE — 87186 SC STD MICRODIL/AGAR DIL: CPT

## 2021-06-25 PROCEDURE — 99282 EMERGENCY DEPT VISIT SF MDM: CPT

## 2021-06-25 NOTE — ED INTEGUMENTARY GENERAL
General


Chief Complaint:  Skin/Wound Problems


Stated Complaint:  POSSIBLE TICK BITE


Nursing Triage Note:  


ARRIVED VIA AMB TO TRIAGE WITH A POSSIBLE ABSCESS OT BACK OF HEAD.  THINKS IT 


MIGHT HAVE STEMMED FROM A TICK BITE. HAS BEEN THERE X3 DAYS.


Source:  patient


Exam Limitations:  no limitations


 (ANNY HYLTON)





History of Present Illness


Date Seen by Provider:  Jun 25, 2021


Time Seen by Provider:  15:05


Initial Comments


Tender nodule to the posterior inferior and lateral aspect of the scalp on the 

right.  Present for 1 week, started after he went camping.  Not sure if this is 

related to a tick bite.  No rash or other systemic symptoms.


Severity:  mild


Location:  none


Associated Symptoms:  denies symptoms (ANNY HYLTON)





Allergies and Home Medications


Allergies


Coded Allergies:  


     prednisone (Verified  Allergy, Unknown, 2/3/10)





Home Medications


Doxycycline Hyclate 100 Mg Tablet, 100 MG PO BID


   Prescribed by: ANNY HYLTON on 6/25/21 1507


Ibuprofen 800 Mg Tablet, 800 MG PO Q8H PRN for PAIN-MILD


   Prescribed by: JUAN CARLOS WRIGHT on 4/29/21 1142


Lisdexamfetamine Dimesylate 60 Mg Capsule, 60 MG PO DAILY, (Reported)


Lithium Carbonate 600 Mg Capsule, 600 MG PO DAILY, (Reported)


Lithium Carbonate 600 Mg Capsule, 1,200 MG PO HS, (Reported)


   TAKES 2 (600MG) CAPS 


Mirtazapine 30 Mg Tablet, 30 MG PO HS, (Reported)


Oxycodone Hcl 5 Mg Tab, 5 MG PO Q4H PRN for PAIN-SEVERE (8-10)


   Prescribed by: JUAN CARLOS WRIGHT on 4/29/21 1146


Quetiapine Fumarate 300 Mg Tablet, 300 MG PO HS, (Reported)





Patient Home Medication List


Home Medication List Reviewed:  Yes


 (ANNY HYLTON)





Review of Systems


Review of Systems


Constitutional:  see HPI


EENTM:  see HPI


Respiratory:  no symptoms reported


Cardiovascular:  no symptoms reported


Genitourinary:  no symptoms reported


Musculoskeletal:  no symptoms reported


Skin:  see HPI


Psychiatric/Neurological:  No Symptoms Reported


Endocrine:  No Symptoms Reported (ANNY HYLTON)





Past Medical-Social-Family Hx


Patient Social History


Alcohol Use:  Occasionally Uses


Drug of Choice:  history of IV drug use, ACID


Smoking Status:  Current Everyday Smoker


Type Used:  Cigarettes


2nd Hand Smoke Exposure:  Yes


Recent Infectious Disease Expo:  No


Recent Hopitalizations:  No


 (ANNY HYLTON)





Immunizations Up To Date


Tetanus Booster (TDap):  Unknown


 (ANNY HYLTON)





Seasonal Allergies


Seasonal Allergies:  No


 (ANNY HYLTON)





Past Medical History


Surgeries:  Yes (LT KNEE AND QUAD, RT SHOULDER, RT KNEE, LT WRIST, JAW 

RECONSTRUCTION)


Orthopedic


Respiratory:  No


Cardiac:  Yes


Endocarditis


Neurological:  No


Reproductive Disorders:  No


Gastrointestinal:  No


Musculoskeletal:  Yes


Fractures


Endocrine:  No


HEENT:  No


Cancer:  No


Psychosocial:  Yes


ADD/ADHD, Anxiety, Bipolar, Depression


Integumentary:  No


Blood Disorders:  No


 (ANNY HYLTON)





Family Medical History





Patient reports no known family medical history.


No Pertinent Family Hx


 (ANNY HYLTON)





Physical Exam


Vital Signs





Vital Signs - First Documented








 6/25/21





 14:57


 


Temp 36.6


 


Pulse 93


 


Resp 16


 


B/P (MAP) 143/82 (102)


 


Pulse Ox 99


 


O2 Delivery Room Air








 (HARDEEP CHUNG MD)


Vital Signs


Capillary Refill : Less Than 3 Seconds 


 (ANNY HYLTON)


General Appearance:  WD/WN, no apparent distress


HEENT:  PERRL/EOMI, normal ENT inspection


Respiratory:  no respiratory distress, no accessory muscle use


Extremities:  normal range of motion, non-tender


Neurologic/Psychiatric:  alert, normal mood/affect, oriented x 3


Skin:  normal color, warm/dry


Skin Problem Location:  scalp (To the posterior right lateral lower scalp just 

above the hairline there is a quarter sized area of induration erythema with a 

central punctum that is expressing purulent material when pressed.)


Skin Problem Character:  abscess


 (ANNY HYLTON)





Procedures/Interventions





   Suture Size:  5-0


 (ANNY HYLTON)





Progress/Results/Core Measures


Results/Orders


Vital Signs/I&O











 6/25/21





 14:57


 


Temp 36.6


 


Pulse 93


 


Resp 16


 


B/P (MAP) 143/82 (102)


 


Pulse Ox 99


 


O2 Delivery Room Air





 (HARDEEP CHUNG MD)








Blood Pressure Mean:                    102











Departure


Impression





   Primary Impression:  


   Abscess


Disposition:  01 HOME, SELF-CARE


Condition:  Stable





Departure-Patient Inst.


Decision time for Depature:  15:05


 (ANNY HYLTON)


Referrals:  


[facility] (PCP/Family)


Primary Care Physician


Patient Instructions:  Skin Abscess





Add. Discharge Instructions:  


1.  Warm compresses to the area


2.  Antibiotics as directed


3.  Follow-up with your doctor next week





All discharge instructions reviewed with patient and/or family. Voiced 

understanding.


Scripts


Doxycycline Hyclate (Doxycycline Hyclate) 100 Mg Tablet


100 MG PO BID, #14 TAB


   Prov: ANNY HYLTON         6/25/21








ATTENDING PHYSICIAN NOTE:


I was physically present as attending physician in the emergency department 

during the care of this patient, but I was not directly involved in the decision

making or delivery of care for this patient. 


 (HARDEEP CHUNG MD)











ANNY HYLTON             Jun 25, 2021 15:07


HARDEEP CHUNG MD        Jun 26, 2021 06:36

## 2021-08-20 ENCOUNTER — HOSPITAL ENCOUNTER (EMERGENCY)
Dept: HOSPITAL 75 - ER | Age: 41
Discharge: HOME | End: 2021-08-20
Payer: SELF-PAY

## 2021-08-20 VITALS — WEIGHT: 190.04 LBS | HEIGHT: 73.03 IN | BODY MASS INDEX: 25.19 KG/M2

## 2021-08-20 VITALS — DIASTOLIC BLOOD PRESSURE: 86 MMHG | SYSTOLIC BLOOD PRESSURE: 132 MMHG

## 2021-08-20 DIAGNOSIS — F31.9: ICD-10-CM

## 2021-08-20 DIAGNOSIS — S92.352A: Primary | ICD-10-CM
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
## 2021-08-20 DIAGNOSIS — S93.402A: ICD-10-CM

## 2021-08-20 DIAGNOSIS — F41.9: ICD-10-CM

## 2021-08-20 PROCEDURE — 96372 THER/PROPH/DIAG INJ SC/IM: CPT

## 2021-08-20 PROCEDURE — 73610 X-RAY EXAM OF ANKLE: CPT

## 2021-08-20 NOTE — DIAGNOSTIC IMAGING REPORT
HISTORY: Left lateral ankle pain and swelling after fall.



COMPARISON: None.



TECHNIQUE: Three views of the left ankle.



FINDINGS: There is moderate soft tissue swelling about the left

ankle, particularly laterally. Ankle mortise appears symmetric.

There are small calcifications distal to the medial malleolus and

the lateral malleolus. This is thought more likely to be chronic

but small avulsion fractures are difficult to exclude. There is a

small left ankle joint effusion. There is a partially visualized

comminuted displaced fracture of the distal left 5th metatarsal

shaft.



IMPRESSION:

1. Moderate soft tissue swelling about the left ankle. Small

calcifications adjacent to the medial and lateral malleoli could

represent small avulsion fractures but are thought more likely to

be chronic.

2. Comminuted, mildly displaced fracture of the distal left 5th

metatarsal. 



Dictated by: 



  Dictated on workstation # [id number]

## 2021-08-20 NOTE — ED LOWER EXTREMITY
General


Stated Complaint:  FALL - L ANKLE PAIN


Source:  patient


Exam Limitations:  no limitations





History of Present Illness


Date Seen by Provider:  Aug 20, 2021


Time Seen by Provider:  20:35


Initial Comments


Fell 8 feet off a ladder twisting the left ankle no other pain no other injuries

did not hit his head


Onset:  just prior to arrival


Severity:  moderate


Pain/Injury Location:  left ankle


Method of Injury:  fell


Modifying Factors:  Worse With Movement





Allergies and Home Medications


Allergies


Coded Allergies:  


     prednisone (Verified  Allergy, Unknown, 2/3/10)





Home Medications


Doxycycline Hyclate 100 Mg Tablet, 100 MG PO BID


   Prescribed by: ANNY HYLTON on 6/25/21 1507


Hydrocodone/Acetaminophen 1 Each Tablet, 1 TAB PO Q4H PRN for PAIN-MODERATE (5-

7)


   Prescribed by: ANNY HYLTON on 8/20/21 2142


Ibuprofen 800 Mg Tablet, 800 MG PO Q8H PRN for PAIN-MILD


   Prescribed by: JUAN CARLOS WRIGHT on 4/29/21 1142


Lisdexamfetamine Dimesylate 60 Mg Capsule, 60 MG PO DAILY, (Reported)


Lithium Carbonate 600 Mg Capsule, 600 MG PO DAILY, (Reported)


Lithium Carbonate 600 Mg Capsule, 1,200 MG PO HS, (Reported)


   TAKES 2 (600MG) CAPS 


Mirtazapine 30 Mg Tablet, 30 MG PO HS, (Reported)


Oxycodone Hcl 5 Mg Tab, 5 MG PO Q4H PRN for PAIN-SEVERE (8-10)


   Prescribed by: JUAN CARLOS WRIGHT on 4/29/21 1146


Quetiapine Fumarate 300 Mg Tablet, 300 MG PO HS, (Reported)





Patient Home Medication List


Home Medication List Reviewed:  Yes





Review of Systems


Constitutional:  see HPI


EENTM:  see HPI


Respiratory:  no symptoms reported


Cardiovascular:  no symptoms reported


Genitourinary:  no symptoms reported


Musculoskeletal:  see HPI


Skin:  no symptoms reported


Psychiatric/Neurological:  No Symptoms Reported





Past Medical-Social-Family Hx


Immunizations Up To Date


Tetanus Booster (TDap):  Unknown





Seasonal Allergies


Seasonal Allergies:  No





Past Medical History


Surgeries:  Yes (LT KNEE AND QUAD, RT SHOULDER, RT KNEE, LT WRIST, JAW 

RECONSTRUCTION)


Orthopedic


Respiratory:  No


Cardiac:  Yes


Endocarditis


Neurological:  No


Reproductive Disorders:  No


Gastrointestinal:  No


Musculoskeletal:  Yes


Fractures


Endocrine:  No


HEENT:  No


Cancer:  No


Psychosocial:  Yes


ADD/ADHD, Anxiety, Bipolar, Depression


Integumentary:  No


Blood Disorders:  No





Family Medical History





Patient reports no known family medical history.


No Pertinent Family Hx





Physical Exam


Vital Signs





Vital Signs - First Documented








 8/20/21





 20:22


 


Temp 37.0


 


Pulse 71


 


Resp 24


 


B/P (MAP) 148/96 (113)


 


Pulse Ox 98


 


O2 Delivery Room Air





Capillary Refill :


Height, Weight, BMI


Height: 6'1"


Weight: 188lbs. 6.4oz. 85.301371ye; 24.00 BMI


Method:Estimated


General Appearance:  WD/WN, no apparent distress


Neck:  non-tender, full range of motion


Respiratory:  no respiratory distress, no accessory muscle use


Hips:  bilateral hip non-tender, bilateral hip normal inspection, bilateral hip 

normal range of motion


Legs:  bilateral leg non-tender, bilateral leg normal inspection, bilateral leg 

normal range of motion


Knees:  bilateral knee non-tender, bilateral knee normal inspection, bilateral 

knee normal range of motion


Ankles:  left ankle pain, left ankle soft tissue tenderness, left ankle 

swelling, left ankle other (Significant swelling circumferentially about the 

left ankle.  There is no tenderness to palpation over the proximal fibula or pr

oximal tibia.  Chest abdomen pelvis is stable flat nontender to palpation.  

Dorsalis pedis pulses +2 and strength.)


Feet:  bilateral foot non-tender, bilateral foot normal inspection, bilateral 

foot normal range of motion


Neurologic/Tendon:  normal sensation, normal motor functions


Neurologic/Psychiatric:  alert, normal mood/affect, oriented x 3


Skin:  normal color, warm/dry





Procedures/Interventions





   Suture Size:  5-0





Progress/Results/Core Measures


Results/Orders


My Orders





Orders - ANNY HYLTON


Morphine  Injection (Morphine  Injection (8/20/21 20:33)


Ankle, Left, 3 Views (8/20/21 20:33)


Ibuprofen  Tablet (Motrin  Tablet) (8/20/21 21:15)


Rx-Hydrocodone/Apap 5-325 Mg (Rx-Vicodin (8/20/21 21:15)


Ibuprofen  Tablet (Motrin  Tablet) (8/20/21 21:12)


Rx-Hydrocodone/Apap 5-325 Mg (Rx-Vicodin (8/20/21 21:13)





Medications Given in ED





Current Medications








 Medications  Dose


 Ordered  Sig/Daniele


 Route  Start Time


 Stop Time Status Last Admin


Dose Admin


 


 Acetaminophen/


 Hydrocodone Bitart  1 ea  Q4H  PRN


 PO  8/20/21 21:15


    8/20/21 21:18


1 EA


 


 Ibuprofen  800 mg  ONCE  ONCE


 PO  8/20/21 21:15


 8/20/21 21:16 DC 8/20/21 21:18


800 MG








Vital Signs/I&O











 8/20/21





 20:22


 


Temp 37.0


 


Pulse 71


 


Resp 24


 


B/P (MAP) 148/96 (113)


 


Pulse Ox 98


 


O2 Delivery Room Air











Departure


Communication (Admissions)


will give crutches and walking boot.





Impression





   Primary Impression:  


   Fracture of foot


   Additional Impression:  


   Sprain and strain of ankle


Disposition:  01 HOME, SELF-CARE


Condition:  Stable





Departure-Patient Inst.


Decision time for Depature:  21:35


Referrals:  


[facility] (PCP/Family)


Primary Care Physician








SCHWAB,TERRY D MD ZAFUTA,MICHAEL P MD


Patient Instructions:  Foot Fracture ED





Add. Discharge Instructions:  


Follow-up with orthopedics.  The orthopedic physicians here in [place] have 

been listed.  Give them a call Monday to make an appointment to be seen.  Wear 

the boot for at least the next 4 weeks.  Do not put any weight on the left foot 

until released by orthopedics.  Elevate the foot is much as possible this will 

help with swelling and subsequently the pain..


Scripts


Hydrocodone/Acetaminophen (Hydrocodone-Acetamin 5-325 mg) 1 Each Tablet


1 TAB PO Q4H PRN for PAIN-MODERATE (5-7), #14 TAB


   Prov: ANNY HYLTON         8/20/21











ANNY HYLTON             Aug 20, 2021 20:36

## 2021-08-26 ENCOUNTER — HOSPITAL ENCOUNTER (EMERGENCY)
Dept: HOSPITAL 75 - ER | Age: 41
Discharge: HOME | End: 2021-08-26
Payer: SELF-PAY

## 2021-08-26 VITALS — DIASTOLIC BLOOD PRESSURE: 87 MMHG | SYSTOLIC BLOOD PRESSURE: 125 MMHG

## 2021-08-26 VITALS — WEIGHT: 190.04 LBS | HEIGHT: 72.83 IN | BODY MASS INDEX: 25.19 KG/M2

## 2021-08-26 DIAGNOSIS — F17.290: ICD-10-CM

## 2021-08-26 DIAGNOSIS — F31.9: ICD-10-CM
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
## 2021-08-26 DIAGNOSIS — Z79.899: ICD-10-CM

## 2021-08-26 DIAGNOSIS — S93.402A: ICD-10-CM

## 2021-08-26 DIAGNOSIS — F41.9: ICD-10-CM

## 2021-08-26 DIAGNOSIS — S92.902A: Primary | ICD-10-CM

## 2021-08-26 PROCEDURE — 96372 THER/PROPH/DIAG INJ SC/IM: CPT

## 2021-08-26 NOTE — DIAGNOSTIC IMAGING REPORT
PROCEDURE: US left lower extremity venous.



TECHNIQUE: Multiple real-time grayscale images were obtained over

the left lower extremity in various projections. Additional

duplex Doppler and color Doppler images were also obtained.



INDICATION: Left leg swelling. Status post injury.



FINDINGS: There is normal augmentation, compression, and color

Doppler flow in the veins of the left leg with no thrombosis or

other abnormality seen.



IMPRESSION: No thrombosis is seen.



Dictated by: 



  Dictated on workstation # [id number]

## 2021-08-26 NOTE — ED LOWER EXTREMITY
General


Chief Complaint:  Lower Extremity


Stated Complaint:  L LEG PAIN/SWELLING


Nursing Triage Note:  


PT PRESENTS TO ED VIA POV FROM HOME WITH COMPLAINTS OF INCREASED PAIN, SWELLING,




NUMBNESS IN HIS L ANKLE FOOT. PT REPORTS HE WAS SEEN IN ED ON 8/20/21 FOR SAME 


INJURY AND HAS AN APPOINTMENT WITH HIS PRIMARY ON FRIDAY TO GET REFERRAL TO 


[facility].


Source:  patient


Exam Limitations:  no limitations


 (ANNY HYLTON)





History of Present Illness


Date Seen by Provider:  Aug 26, 2021


Time Seen by Provider:  12:51


Initial Comments


To ER with some numbness and tingling over the left fourth and fifth toes.  Has 

a known left fifth metatarsal fracture a few days ago.  Has increasing swelling 

about the ankle.  He is wearing his boot.  He has an appointment with 

orthopedics next week.


Onset:  just prior to arrival


Severity:  moderate


Pain/Injury Location:  left foot


Method of Injury:  unknown


Modifying Factors:  Improves With Movement (ANNY HYLTON)





Allergies and Home Medications


Allergies


Coded Allergies:  


     prednisone (Verified  Allergy, Unknown, 2/3/10)





Home Medications


Doxycycline Hyclate 100 Mg Tablet, 100 MG PO BID


   Prescribed by: ANNY HYLTON on 6/25/21 1507


Hydrocodone/Acetaminophen 1 Each Tablet, 1 TAB PO Q4H PRN for PAIN-MODERATE (5-

7)


   Prescribed by: ANNY HYLTON on 8/20/21 2142


Hydrocodone/Acetaminophen 1 Each Tablet, 1 TAB PO Q4H PRN for PAIN-MODERATE (5-

7)


   Prescribed by: ANNY HYLTON on 8/26/21 1254


Ibuprofen 800 Mg Tablet, 800 MG PO Q8H PRN for PAIN-MILD


   Prescribed by: JUAN CARLOS WRIGHT on 4/29/21 1142


Lisdexamfetamine Dimesylate 60 Mg Capsule, 60 MG PO DAILY, (Reported)


Lithium Carbonate 600 Mg Capsule, 600 MG PO DAILY, (Reported)


Lithium Carbonate 600 Mg Capsule, 1,200 MG PO HS, (Reported)


   TAKES 2 (600MG) CAPS 


Mirtazapine 30 Mg Tablet, 30 MG PO HS, (Reported)


Oxycodone Hcl 5 Mg Tab, 5 MG PO Q4H PRN for PAIN-SEVERE (8-10)


   Prescribed by: JUAN CARLOS WRIGHT on 4/29/21 1146


Quetiapine Fumarate 300 Mg Tablet, 300 MG PO HS, (Reported)





Patient Home Medication List


Home Medication List Reviewed:  Yes


 (ANNY HYLTON)





Review of Systems


Constitutional:  see HPI


EENTM:  see HPI


Respiratory:  no symptoms reported


Cardiovascular:  no symptoms reported


Genitourinary:  no symptoms reported


Musculoskeletal:  see HPI


Skin:  no symptoms reported


Psychiatric/Neurological:  No Symptoms Reported (ANNY HYLTON)





Past Medical-Social-Family Hx


Patient Social History


Tobacco Use?:  Yes


Smoking Status:  Current Everyday Smoker


Substance use?:  Yes


Additional substance use comme:  SPEED


Substance frequency:  Once in a while


Alcohol Use?:  Yes


Alcohol Frequency:  Couple times a week


Pt feels they are or have been:  No


 (ANNY HYLTON)





Immunizations Up To Date


Tetanus Booster (TDap):  Unknown


 (ANNY HYLTON)





Seasonal Allergies


Seasonal Allergies:  No


 (ANNY HYLTON)





Past Medical History


Surgery/Hospitalization HX:  


ORTHO SX. PMH: BIPOLAR


Surgeries:  Yes (LT KNEE AND QUAD, RT SHOULDER, RT KNEE, LT WRIST, JAW 

RECONSTRUCTION)


Orthopedic


Respiratory:  No


Cardiac:  Yes


Endocarditis


Neurological:  No


Reproductive Disorders:  No


Gastrointestinal:  No


Musculoskeletal:  Yes


Fractures


Endocrine:  No


HEENT:  No


Cancer:  No


Psychosocial:  Yes


ADD/ADHD, Anxiety, Bipolar, Depression


Integumentary:  No


Blood Disorders:  No


 (ANNY HYLTON)





Family Medical History





Patient reports no known family medical history.


No Pertinent Family Hx


 (ANNY HYLTON)





Physical Exam


Vital Signs





Vital Signs - First Documented








 8/26/21





 12:36


 


Pulse 78


 


Resp 18


 


B/P (MAP) 125/87 (100)


 


Pulse Ox 99








 (HARDEEP CHUNG MD)


Vital Signs


Capillary Refill : Less Than 3 Seconds 


 (ANNY HYLTON)


Height, Weight, BMI


Height: 6'1"


Weight: 188lbs. 6.4oz. 85.592967gp; 25.00 BMI


Method:Estimated


General Appearance:  WD/WN, no apparent distress


HEENT:  PERRL/EOMI, normal ENT inspection


Respiratory:  no respiratory distress, no accessory muscle use


Hips:  bilateral hip non-tender, bilateral hip normal inspection, bilateral hip 

normal range of motion


Legs:  bilateral leg non-tender, bilateral leg normal inspection, bilateral leg 

normal range of motion


Knees:  bilateral knee non-tender, bilateral knee normal inspection, bilateral 

knee normal range of motion


Ankles:  left ankle pain, left ankle soft tissue tenderness, left ankle swelling


Feet:  left foot pain, left foot soft tissue tenderness, left foot swelling


Neurologic/Psychiatric:  alert, normal mood/affect, oriented x 3


Skin:  normal color, warm/dry (ANNY HYLTON)





Procedures/Interventions





   Suture Size:  5-0


 (ANNY HYLTON)





Progress/Results/Core Measures


Results/Orders


Vital Signs/I&O











 8/26/21 8/26/21





 12:36 14:15


 


Pulse 78 78


 


Resp 18 18


 


B/P (MAP) 125/87 (100) 125/87 (100)


 


Pulse Ox 99 99





 (HARDEEP CHUNG MD)








Blood Pressure Mean:                    100











Departure


Impression





   Primary Impression:  


   Fracture of foot


   Additional Impression:  


   Sprain and strain of ankle


Disposition:  01 HOME, SELF-CARE


Condition:  Stable





Departure-Patient Inst.


Decision time for Depature:  12:52


 (ANNY HYLTON)


Referrals:  


[facility] (PCP/Family)


Primary Care Physician


Patient Instructions:  Foot Fracture ED


Scripts


Hydrocodone/Acetaminophen (Hydrocodone-Acetamin 5-325 mg) 1 Each Tablet


1 TAB PO Q4H PRN for PAIN-MODERATE (5-7), #14 TAB


   Prov: ANNY HYLTON         8/26/21








ATTENDING PHYSICIAN NOTE:


I was physically present as attending physician in the emergency department 

during the care of this patient, but I was not directly involved in the decision

making or delivery of care for this patient. 


 (HARDEEP CHUNG MD)











ANNY HYLTON             Aug 26, 2021 12:56


HARDEEP CHUNG MD        Aug 28, 2021 18:07

## 2022-01-11 ENCOUNTER — HOSPITAL ENCOUNTER (EMERGENCY)
Dept: HOSPITAL 75 - ER | Age: 42
Discharge: HOME | End: 2022-01-11
Payer: SELF-PAY

## 2022-01-11 VITALS — HEIGHT: 71.65 IN | WEIGHT: 191.8 LBS | BODY MASS INDEX: 26.26 KG/M2

## 2022-01-11 VITALS — DIASTOLIC BLOOD PRESSURE: 99 MMHG | SYSTOLIC BLOOD PRESSURE: 123 MMHG
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
## 2022-01-11 DIAGNOSIS — S22.42XA: Primary | ICD-10-CM

## 2022-01-11 PROCEDURE — 94664 DEMO&/EVAL PT USE INHALER: CPT

## 2022-01-11 PROCEDURE — 71101 X-RAY EXAM UNILAT RIBS/CHEST: CPT

## 2022-01-11 PROCEDURE — 99281 EMR DPT VST MAYX REQ PHY/QHP: CPT

## 2022-01-11 PROCEDURE — 96372 THER/PROPH/DIAG INJ SC/IM: CPT

## 2022-01-11 NOTE — ED GENERAL
General


Stated Complaint:  L RIB PAIN


Source of Information:  Patient


Exam Limitations:  No Limitations





History of Present Illness


Date Seen by Provider:  2022


Time Seen by Provider:  12:13


Initial Comments


To ER with left lateral rib pain after he tripped over his chainsaw striking the

left lateral chest wall on a rail going up some steps about 45 minutes prior to 

arrival. Did not hit his head no other injuries pain is worsened by deep 

breathing.


Timing/Duration:  1 Hour


Severity:  Moderate


Modifying Factors:  worse with Medication


Associated Systoms:  Denies Symptoms





Allergies and Home Medications


Allergies


Coded Allergies:  


     prednisone (Verified  Allergy, Unknown, 2/3/10)





Patient Home Medication List


Home Medication List Reviewed:  Yes


Doxycycline Hyclate (Doxycycline Hyclate) 100 Mg Tablet, 100 MG PO BID


   Prescribed by: ANNY HYLTON on 21 1507


Hydrocodone/Acetaminophen (Hydrocodone-Acetamin 5-325 mg) 1 Each Tablet, 1 TAB 

PO Q4H PRN for PAIN-MODERATE (5-7)


   Prescribed by: ANNY HYLTON on 21 2142


Hydrocodone/Acetaminophen (Hydrocodone-Acetamin 5-325 mg) 1 Each Tablet, 1 TAB 

PO Q4H PRN for PAIN-MODERATE (5-7)


   Prescribed by: ANNY HYLTON on 21 1254


Hydrocodone/Acetaminophen (Hydrocodone-Acetamin 7.5-325) 1 Each Tablet, 1 EACH 

PO Q4H PRN for PAIN-SEVERE (8-10)


   Prescribed by: ANNY HYLTON on 22 1259


Ibuprofen (Ibuprofen) 800 Mg Tablet, 800 MG PO Q8H PRN for PAIN-MILD


   Prescribed by: JUAN CARLOS WRIHGT on 21 1142


Lisdexamfetamine Dimesylate (Vyvanse) 60 Mg Capsule, 60 MG PO DAILY, (Reported)


   Entered as Reported by: MARIA ESTHER SWENSON on 21 1206


Lithium Carbonate (Lithium Carbonate) 600 Mg Capsule, 600 MG PO DAILY, 

(Reported)


   Entered as Reported by: MARIA ESTHER SWENSON on 21 1206


Lithium Carbonate (Lithium Carbonate) 600 Mg Capsule, 1,200 MG PO HS, (Reported)


   Entered as Reported by: MARIA ESTHER SWENSON on 21 1206


Mirtazapine (Mirtazapine) 30 Mg Tablet, 30 MG PO HS, (Reported)


   Entered as Reported by: MARIA ESTHER SWENSON on 21 1206


Oxycodone Hcl (Oxyir Tablet) 5 Mg Tab, 5 MG PO Q4H PRN for PAIN-SEVERE (8-10)


   Prescribed by: JUAN CARLOS WRIGHT on 21 1146


Quetiapine Fumarate (Seroquel) 300 Mg Tablet, 300 MG PO HS, (Reported)


   Entered as Reported by: MARIA ESTHER SWENSON on 21 1206





Review of Systems


Review of Systems


Constitutional:  see HPI


EENTM:  see HPI


Respiratory:  no symptoms reported


Cardiovascular:  no symptoms reported


Genitourinary:  no symptoms reported


Musculoskeletal:  see HPI


Skin:  no symptoms reported


Psychiatric/Neurological:  No Symptoms Reported


Hematologic/Lymphatic:  No Symptoms Reported


Immunological/Allergic:  no symptoms reported





Past Medical-Social-Family Hx


Immunizations Up To Date


Tetanus Booster (TDap):  Unknown





Seasonal Allergies


Seasonal Allergies:  No





Past Medical History


Surgery/Hospitalization HX:  


ORTHO SX. PMH: BIPOLAR


Surgeries:  Yes (LT KNEE AND QUAD, RT SHOULDER, RT KNEE, LT WRIST, JAW 

RECONSTRUCTION)


Orthopedic


Respiratory:  No


Cardiac:  Yes


Endocarditis


Neurological:  No


Reproductive Disorders:  No


Gastrointestinal:  No


Musculoskeletal:  Yes


Fractures


Endocrine:  No


HEENT:  No


Cancer:  No


Psychosocial:  Yes


ADD/ADHD, Anxiety, Bipolar, Depression


Integumentary:  No


Blood Disorders:  No





Family Medical History





Patient reports no known family medical history.


No Pertinent Family Hx





Physical Exam


Vital Signs





Vital Signs - First Documented








 22





 12:09


 


Temp 36.0


 


Pulse 77


 


Resp 18


 


B/P (MAP) 120/87 (98)


 


Pulse Ox 99


 


O2 Delivery Room Air





Capillary Refill :


Height, Weight, BMI


Height: 6'1"


Weight: 188lbs. 6.4oz. 85.360344so; 25.00 BMI


Method:Estimated


General Appearance:  No Apparent Distress, WD/WN, Thin


Eyes:  Bilateral Eye Normal Inspection, Bilateral Eye PERRL, Bilateral Eye EOMI


Neck:  Full Range of Motion, Normal Inspection


Respiratory:  No Accessory Muscle Use, No Respiratory Distress, Other (Lungs are

clear with good air movement bilaterally. The left lateral chest wall is tender 

to palpation but without abrasion ecchymosis erythema or crepitus.)


Cardiovascular:  Regular Rate, Rhythm, Normal Peripheral Pulses


Gastrointestinal:  Normal Bowel Sounds, Non Tender, Soft, Other (No left upper 

quadrant tenderness to palpation to suggest a splenic injury)


Extremity:  Normal Capillary Refill, Normal Inspection


Neurologic/Psychiatric:  Alert, Oriented x3





Procedures/Interventions





   Suture Size:  5-0





Progress/Results/Core Measures


Suspected Sepsis


SIRS


Temperature: 


Pulse:  


Respiratory Rate: 


 


Blood Pressure  / 


Mean:





Results/Orders


My Orders





Orders - ANNY HYLTON


Ribs/Unilateral With Chest (22 12:13)


Hydrocodone/Apap 5/325 Tablet (Lortab 5 (22 12:15)


Incentive Spirometry Initial (22 12:56)


Incentive Spirometry (Nursing) Q2H (22 12:56)


Ketorolac Injection (Toradol Injection) (22 13:15)





Medications Given in ED





Current Medications








 Medications  Dose


 Ordered  Sig/Daniele


 Route  Start Time


 Stop Time Status Last Admin


Dose Admin


 


 Acetaminophen/


 Hydrocodone Bitart  1 ea  ONCE  ONCE


 PO  22 12:15


 22 12:16 DC 22 12:19


1 EA


 


 Ketorolac


 Tromethamine  60 mg  ONCE  ONCE


 IM  22 13:15


 22 13:16 DC 22 13:13


60 MG








Vital Signs/I&O











 22





 12:09


 


Temp 36.0


 


Pulse 77


 


Resp 18


 


B/P (MAP) 120/87 (98)


 


Pulse Ox 99


 


O2 Delivery Room Air





Capillary Refill :





Departure


Communication (Admissions)


NAME:   CARTER LANDIN II


MED REC#:   [id number]


ACCOUNT#:   [id number]


PT STATUS:   REG ER


:   1980


PHYSICIAN:   ANNY HYLTON


ADMIT DATE:   22/ER


                                   ***Draft***


Date of Exam:22





RIBS/UNILATERAL WITH CHEST








INDICATION: Fall with left rib pain and bruising.





TIME OF EXAM: 12:58 p.m.





FINDINGS: Multiple views of the left ribs were obtained. There is


a fracture involving the anterior aspect of the left seventh rib.


There also appears to be a nondisplaced fracture of the anterior


left eighth rib. No pulmonary contusion, effusion, or


pneumothorax is detected.





IMPRESSION: Left anterior seventh and eighth rib fractures.





  Dictated on workstation # [id number]








Dict:   22 1319


Trans:   22 1324


 [id number]





Interpreted by:     FIDEL ARROYO MD


Electronically signed by:





Impression





   Primary Impression:  


   Rib fracture


Disposition:   HOME, SELF-CARE


Condition:  Stable





Departure-Patient Inst.


Decision time for Depature:  12:57


Referrals:  


[facility] (PCP/Family)


Primary Care Physician


Patient Instructions:  Rib Fracture or Bruised Rib ED





Add. Discharge Instructions:  


1.  Use the incentive spirometer every 2 hours for the next few days to help 

expand the lungs.  Pain medication as directed.  Return to ER for any worsening.


Scripts


Hydrocodone/Acetaminophen (Hydrocodone-Acetamin 7.5-325) 1 Each Tablet


1 EACH PO Q4H PRN for PAIN-SEVERE (8-10), #20 TAB


   Prov: ANNY HYLTON         22











ANNY HYLTON             2022 12:15

## 2022-01-11 NOTE — DIAGNOSTIC IMAGING REPORT
INDICATION: Fall with left rib pain and bruising.



TIME OF EXAM: 12:58 p.m.



FINDINGS: Multiple views of the left ribs were obtained. There is

a fracture involving the anterior aspect of the left seventh rib.

There also appears to be a nondisplaced fracture of the anterior

left eighth rib. No pulmonary contusion, effusion, or

pneumothorax is detected.



IMPRESSION: Left anterior seventh and eighth rib fractures.



Dictated by: 



  Dictated on workstation # [id number]

## 2022-03-16 ENCOUNTER — HOSPITAL ENCOUNTER (OUTPATIENT)
Dept: HOSPITAL 75 - ER | Age: 42
Setting detail: OBSERVATION
LOS: 2 days | Discharge: HOME | End: 2022-03-18
Attending: FAMILY MEDICINE | Admitting: FAMILY MEDICINE
Payer: SELF-PAY

## 2022-03-16 VITALS — SYSTOLIC BLOOD PRESSURE: 126 MMHG | DIASTOLIC BLOOD PRESSURE: 81 MMHG

## 2022-03-16 VITALS — SYSTOLIC BLOOD PRESSURE: 136 MMHG | DIASTOLIC BLOOD PRESSURE: 76 MMHG

## 2022-03-16 VITALS — BODY MASS INDEX: 26.03 KG/M2 | HEIGHT: 72.83 IN | WEIGHT: 196.43 LBS

## 2022-03-16 VITALS — DIASTOLIC BLOOD PRESSURE: 84 MMHG | SYSTOLIC BLOOD PRESSURE: 127 MMHG

## 2022-03-16 VITALS — SYSTOLIC BLOOD PRESSURE: 123 MMHG | DIASTOLIC BLOOD PRESSURE: 84 MMHG

## 2022-03-16 VITALS — SYSTOLIC BLOOD PRESSURE: 135 MMHG | DIASTOLIC BLOOD PRESSURE: 80 MMHG

## 2022-03-16 VITALS — DIASTOLIC BLOOD PRESSURE: 73 MMHG | SYSTOLIC BLOOD PRESSURE: 123 MMHG

## 2022-03-16 DIAGNOSIS — F17.210: ICD-10-CM

## 2022-03-16 DIAGNOSIS — F90.9: ICD-10-CM

## 2022-03-16 DIAGNOSIS — F31.9: ICD-10-CM

## 2022-03-16 DIAGNOSIS — F12.90: ICD-10-CM

## 2022-03-16 DIAGNOSIS — F10.20: ICD-10-CM

## 2022-03-16 DIAGNOSIS — K86.89: ICD-10-CM

## 2022-03-16 DIAGNOSIS — Z88.0: ICD-10-CM

## 2022-03-16 DIAGNOSIS — R11.2: ICD-10-CM

## 2022-03-16 DIAGNOSIS — R10.13: Primary | ICD-10-CM
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
## 2022-03-16 LAB
ALBUMIN SERPL-MCNC: 4.5 GM/DL (ref 3.2–4.5)
ALP SERPL-CCNC: 89 U/L (ref 40–136)
ALT SERPL-CCNC: 17 U/L (ref 0–55)
APTT PPP: YELLOW S
BACTERIA #/AREA URNS HPF: NEGATIVE /HPF
BASOPHILS # BLD AUTO: 0 10^3/UL (ref 0–0.1)
BASOPHILS NFR BLD AUTO: 0 % (ref 0–10)
BILIRUB SERPL-MCNC: 0.4 MG/DL (ref 0.1–1)
BILIRUB UR QL STRIP: NEGATIVE
BUN/CREAT SERPL: 13
CALCIUM SERPL-MCNC: 9.9 MG/DL (ref 8.5–10.1)
CAOX CRY #/AREA URNS LPF: (no result) /LPF
CHLORIDE SERPL-SCNC: 106 MMOL/L (ref 98–107)
CO2 SERPL-SCNC: 19 MMOL/L (ref 21–32)
CREAT SERPL-MCNC: 0.92 MG/DL (ref 0.6–1.3)
EOSINOPHIL # BLD AUTO: 0.2 10^3/UL (ref 0–0.3)
EOSINOPHIL NFR BLD AUTO: 2 % (ref 0–10)
FIBRINOGEN PPP-MCNC: CLEAR MG/DL
GFR SERPLBLD BASED ON 1.73 SQ M-ARVRAT: 107 ML/MIN
GLUCOSE SERPL-MCNC: 116 MG/DL (ref 70–105)
GLUCOSE UR STRIP-MCNC: NEGATIVE MG/DL
HCT VFR BLD CALC: 46 % (ref 40–54)
HGB BLD-MCNC: 15 G/DL (ref 13.3–17.7)
KETONES UR QL STRIP: NEGATIVE
LEUKOCYTE ESTERASE UR QL STRIP: NEGATIVE
LIPASE SERPL-CCNC: 28 U/L (ref 8–78)
LYMPHOCYTES # BLD AUTO: 2.7 10^3/UL (ref 1–4)
LYMPHOCYTES NFR BLD AUTO: 26 % (ref 12–44)
MANUAL DIFFERENTIAL PERFORMED BLD QL: NO
MCH RBC QN AUTO: 31 PG (ref 25–34)
MCHC RBC AUTO-ENTMCNC: 33 G/DL (ref 32–36)
MCV RBC AUTO: 95 FL (ref 80–99)
MONOCYTES # BLD AUTO: 0.9 10^3/UL (ref 0–1)
MONOCYTES NFR BLD AUTO: 9 % (ref 0–12)
NEUTROPHILS # BLD AUTO: 6.4 10^3/UL (ref 1.8–7.8)
NEUTROPHILS NFR BLD AUTO: 63 % (ref 42–75)
NITRITE UR QL STRIP: NEGATIVE
PH UR STRIP: 6 [PH] (ref 5–9)
PLATELET # BLD: 388 10^3/UL (ref 130–400)
PMV BLD AUTO: 10.2 FL (ref 9–12.2)
POTASSIUM SERPL-SCNC: 4 MMOL/L (ref 3.6–5)
PROT SERPL-MCNC: 7.3 GM/DL (ref 6.4–8.2)
PROT UR QL STRIP: (no result)
RBC #/AREA URNS HPF: (no result) /HPF
SODIUM SERPL-SCNC: 137 MMOL/L (ref 135–145)
SP GR UR STRIP: >=1.03 (ref 1.02–1.02)
SQUAMOUS #/AREA URNS HPF: (no result) /HPF
WBC # BLD AUTO: 10.1 10^3/UL (ref 4.3–11)
WBC #/AREA URNS HPF: (no result) /HPF

## 2022-03-16 PROCEDURE — 80048 BASIC METABOLIC PNL TOTAL CA: CPT

## 2022-03-16 PROCEDURE — 96376 TX/PRO/DX INJ SAME DRUG ADON: CPT

## 2022-03-16 PROCEDURE — 96375 TX/PRO/DX INJ NEW DRUG ADDON: CPT

## 2022-03-16 PROCEDURE — 99285 EMERGENCY DEPT VISIT HI MDM: CPT

## 2022-03-16 PROCEDURE — 86141 C-REACTIVE PROTEIN HS: CPT

## 2022-03-16 PROCEDURE — 80053 COMPREHEN METABOLIC PANEL: CPT

## 2022-03-16 PROCEDURE — 96374 THER/PROPH/DIAG INJ IV PUSH: CPT

## 2022-03-16 PROCEDURE — 80320 DRUG SCREEN QUANTALCOHOLS: CPT

## 2022-03-16 PROCEDURE — 81000 URINALYSIS NONAUTO W/SCOPE: CPT

## 2022-03-16 PROCEDURE — 83690 ASSAY OF LIPASE: CPT

## 2022-03-16 PROCEDURE — 74177 CT ABD & PELVIS W/CONTRAST: CPT

## 2022-03-16 PROCEDURE — 85025 COMPLETE CBC W/AUTO DIFF WBC: CPT

## 2022-03-16 PROCEDURE — 96361 HYDRATE IV INFUSION ADD-ON: CPT

## 2022-03-16 PROCEDURE — 36415 COLL VENOUS BLD VENIPUNCTURE: CPT

## 2022-03-16 PROCEDURE — 76705 ECHO EXAM OF ABDOMEN: CPT

## 2022-03-16 RX ADMIN — HYDROMORPHONE HYDROCHLORIDE PRN MG: 2 INJECTION, SOLUTION INTRAMUSCULAR; INTRAVENOUS; SUBCUTANEOUS at 22:58

## 2022-03-16 RX ADMIN — FENTANYL CITRATE PRN MCG: 50 INJECTION, SOLUTION INTRAMUSCULAR; INTRAVENOUS at 12:54

## 2022-03-16 RX ADMIN — ASCORBIC ACID, VITAMIN A PALMITATE, CHOLECALCIFEROL, THIAMINE HYDROCHLORIDE, RIBOFLAVIN-5 PHOSPHATE SODIUM, PYRIDOXINE HYDROCHLORIDE, NIACINAMIDE, DEXPANTHENOL, ALPHA-TOCOPHEROL ACETATE, VITAMIN K1, FOLIC ACID, BIOTIN, CYANOCOBALAMIN SCH MLS/HR: 200; 3300; 200; 6; 3.6; 6; 40; 15; 10; 150; 600; 60; 5 INJECTION, SOLUTION INTRAVENOUS at 12:53

## 2022-03-16 RX ADMIN — SODIUM CHLORIDE, SODIUM LACTATE, POTASSIUM CHLORIDE, AND CALCIUM CHLORIDE SCH MLS/HR: 600; 310; 30; 20 INJECTION, SOLUTION INTRAVENOUS at 18:40

## 2022-03-16 RX ADMIN — FENTANYL CITRATE PRN MCG: 50 INJECTION, SOLUTION INTRAMUSCULAR; INTRAVENOUS at 20:27

## 2022-03-16 RX ADMIN — PANTOPRAZOLE SODIUM SCH MG: 40 INJECTION, POWDER, FOR SOLUTION INTRAVENOUS at 21:29

## 2022-03-16 RX ADMIN — SODIUM CHLORIDE, SODIUM LACTATE, POTASSIUM CHLORIDE, AND CALCIUM CHLORIDE SCH MLS/HR: 600; 310; 30; 20 INJECTION, SOLUTION INTRAVENOUS at 12:53

## 2022-03-16 RX ADMIN — FENTANYL CITRATE PRN MCG: 50 INJECTION, SOLUTION INTRAMUSCULAR; INTRAVENOUS at 17:23

## 2022-03-16 NOTE — ED ABDOMINAL PAIN
General


Chief Complaint:  Abdominal/GI Problems


Stated Complaint:  ABD PAIN


Nursing Triage Note:  


ARRIVED VIA AMB TO ROOM 07 WITH ABD PAIN STARTING AT 0300 TODAY.


Source of Information:  Patient


Exam Limitations:  No Limitations





History of Present Illness


Date Seen by Provider:  Mar 16, 2022


Time Seen by Provider:  07:24


Initial Comments


This 41-year-old gentleman presents to the emergency room with upper abdominal 

pain since 0400 with associated nausea and vomiting.  He describes intense 

sweats during the pain and vomiting.  He has had a pressure in his abdomen like 

a need to have a bowel movement or pass gas.  He has been able to pass flatus 

and small bowel movement, but that did not improve the symptoms.  He is 

afebrile.  Denies diarrhea.  He admits to drinking alcohol 3+ days per week up 

to 6 beers per day.  He also smokes marijuana with his most recent use 

yesterday.





Allergies and Home Medications


Allergies


Coded Allergies:  


     prednisone (Verified  Allergy, Unknown, 2/3/10)





Patient Home Medication List


Home Medication List Reviewed:  Yes


Ibuprofen (Ibuprofen) 800 Mg Tablet, 800 MG PO Q8H PRN for PAIN-MILD


   Prescribed by: JUAN CARLOS WRIGHT on 21 1142


Lisdexamfetamine Dimesylate (Vyvanse) 60 Mg Capsule, 60 MG PO DAILY, (Reported)


   Entered as Reported by: MARIA ESTHER SWENSON on 21 1206


Lithium Carbonate (Lithium Carbonate) 600 Mg Capsule, 600 MG PO DAILY, 

(Reported)


   Entered as Reported by: MARIA ESTHER SWENSON on 21 1206


Lithium Carbonate (Lithium Carbonate) 600 Mg Capsule, 1,200 MG PO HS, (Reported)


   Entered as Reported by: MARIA ESTHER SWENSON on 21 1206


Mirtazapine (Mirtazapine) 30 Mg Tablet, 30 MG PO HS, (Reported)


   Entered as Reported by: MARIA ESTHER SWENSON on 21 1206


Quetiapine Fumarate (Seroquel) 300 Mg Tablet, 300 MG PO HS, (Reported)


   Entered as Reported by: MARIA ESTHER SWENSON on 21 1206


Discontinued Medications


Doxycycline Hyclate (Doxycycline Hyclate) 100 Mg Tablet, 100 MG PO BID


   Discontinued Reason: No Longer Taking


   Prescribed by: ANNY HYLTON on 21 6268


   Last Action: Discontinued


Hydrocodone/Acetaminophen (Hydrocodone-Acetamin 5-325 mg) 1 Each Tablet, 1 TAB 

PO Q4H PRN for PAIN-MODERATE (5-7)


   Discontinued Reason: No Longer Taking


   Prescribed by: ANNY HYLTON on 21 2142


   Last Action: Discontinued


Hydrocodone/Acetaminophen (Hydrocodone-Acetamin 5-325 mg) 1 Each Tablet, 1 TAB 

PO Q4H PRN for PAIN-MODERATE (5-7)


   Discontinued Reason: No Longer Taking


   Prescribed by: ANNY HYLTON on 21 1254


   Last Action: Discontinued


Hydrocodone/Acetaminophen (Hydrocodone-Acetamin 7.5-325) 1 Each Tablet, 1 EACH 

PO Q4H PRN for PAIN-SEVERE (8-10)


   Discontinued Reason: No Longer Taking


   Prescribed by: ANNY HYLTON on 22 1259


   Last Action: Discontinued


Oxycodone Hcl (Oxyir Tablet) 5 Mg Tab, 5 MG PO Q4H PRN for PAIN-SEVERE (8-10)


   Discontinued Reason: No Longer Taking


   Prescribed by: JUAN CARLOS WRIGHT on 21 1146


   Last Action: Discontinued





Review of Systems


Review of Systems


Constitutional:  see HPI, diaphoresis


EENTM:  No Symptoms Reported


Respiratory:  No Symptoms Reported


Cardiovascular:  No Symptoms Reported


Gastrointestinal:  See HPI


Genitourinary:  No Symptoms Reported


Musculoskeletal:  no symptoms reported


Skin:  no symptoms reported


Psychiatric/Neurological:  No Symptoms Reported


Endocrine:  No Symptoms Reported


Hematologic/Lymphatic:  No Symptoms Reported





Past Medical-Social-Family Hx


Patient Social History


Smoking Status:  Current Everyday Smoker


Substance use?:  Yes


Substance type:  Marijuana


Alcohol Use?:  Yes


Alcohol type:  Beer


Alcohol Frequency:  Couple times a week (3 or more days per week)





Immunizations Up To Date


Tetanus Booster (TDap):  Unknown





Seasonal Allergies


Seasonal Allergies:  No





Past Medical History


Surgery/Hospitalization HX:  


ORTHO SX. PMH: BIPOLAR


Surgeries:  Yes (LT KNEE AND QUAD, RT SHOULDER, RT KNEE, LT WRIST, JAW 

RECONSTRUCTION)


Orthopedic (Numerous)


Respiratory:  No


Cardiac:  Yes


Endocarditis


Neurological:  No


Reproductive Disorders:  No


Gastrointestinal:  No


Musculoskeletal:  Yes


Fractures


Endocrine:  No


HEENT:  No


Cancer:  No


Psychosocial:  Yes


ADD/ADHD, Anxiety, Bipolar, Depression


Integumentary:  No


Blood Disorders:  No





Family Medical History





Patient reports no known family medical history.


No Pertinent Family Hx





Physical Exam


Vital Signs





Vital Signs - First Documented








 3/16/22





 07:30


 


Temp 35.3


 


Pulse 88


 


Resp 16


 


B/P (MAP) 167/105 (125)


 


Pulse Ox 98


 


O2 Delivery Room Air





Capillary Refill : Less Than 3 Seconds


Height/Weight/BMI


Height: 6'1"


Weight: 188lbs. 6.4oz. 85.358366di; 25.00 BMI


Method:Estimated


General Appearance:  WD/WN, moderate distress, other (Vomiting during)


HEENT:  PERRL/EOMI, normal ENT inspection, other (Oropharynx dry)


Neck:  normal inspection


Respiratory:  lungs clear, normal breath sounds, no respiratory distress


Cardiovascular:  regular rate, rhythm, no edema, no murmur


Gastrointestinal:  normal bowel sounds, soft; No distended; tenderness (Mild 

tenderness in the upper right and left quadrant with more pronounced tenderness 

in the epigastrium)


Extremities:  normal inspection, no pedal edema


Neurologic/Psychiatric:  no motor/sensory deficits, alert, normal mood/affect, 

oriented x 3


Skin:  normal color, warm/dry





Procedures/Interventions





   Suture Size:  5-0





Progress/Results/Core Measures


Results/Orders


Lab Results





Laboratory Tests








Test


 3/16/22


07:32 3/16/22


07:34 3/16/22


07:37 Range/Units


 


 


White Blood Count


 


 10.1 


 


 4.3-11.0


10^3/uL


 


Red Blood Count


 


 4.80 


 


 4.30-5.52


10^6/uL


 


Hemoglobin  15.0   13.3-17.7  g/dL


 


Hematocrit  46   40-54  %


 


Mean Corpuscular Volume  95   80-99  fL


 


Mean Corpuscular Hemoglobin  31   25-34  pg


 


Mean Corpuscular Hemoglobin


Concent 


 33 


 


 32-36  g/dL





 


Red Cell Distribution Width  13.8   10.0-14.5  %


 


Platelet Count


 


 388 


 


 130-400


10^3/uL


 


Mean Platelet Volume  10.2   9.0-12.2  fL


 


Immature Granulocyte % (Auto)  0    %


 


Neutrophils (%) (Auto)  63   42-75  %


 


Lymphocytes (%) (Auto)  26   12-44  %


 


Monocytes (%) (Auto)  9   0-12  %


 


Eosinophils (%) (Auto)  2   0-10  %


 


Basophils (%) (Auto)  0   0-10  %


 


Neutrophils # (Auto)


 


 6.4 


 


 1.8-7.8


10^3/uL


 


Lymphocytes # (Auto)


 


 2.7 


 


 1.0-4.0


10^3/uL


 


Monocytes # (Auto)


 


 0.9 


 


 0.0-1.0


10^3/uL


 


Eosinophils # (Auto)


 


 0.2 


 


 0.0-0.3


10^3/uL


 


Basophils # (Auto)


 


 0.0 


 


 0.0-0.1


10^3/uL


 


Immature Granulocyte # (Auto)


 


 0.0 


 


 0.0-0.1


10^3/uL


 


Sodium Level  137   135-145  MMOL/L


 


Potassium Level  4.0   3.6-5.0  MMOL/L


 


Chloride Level  106     MMOL/L


 


Carbon Dioxide Level  19 L  21-32  MMOL/L


 


Anion Gap  12   5-14  MMOL/L


 


Blood Urea Nitrogen  12   7-18  MG/DL


 


Creatinine


 


 0.92 


 


 0.60-1.30


MG/DL


 


Estimat Glomerular Filtration


Rate 


 107 


 


  





 


BUN/Creatinine Ratio  13    


 


Glucose Level  116 H    MG/DL


 


Calcium Level  9.9   8.5-10.1  MG/DL


 


Corrected Calcium  9.5   8.5-10.1  MG/DL


 


Total Bilirubin  0.4   0.1-1.0  MG/DL


 


Aspartate Amino Transf


(AST/SGOT) 


 20 


 


 5-34  U/L





 


Alanine Aminotransferase


(ALT/SGPT) 


 17 


 


 0-55  U/L





 


Alkaline Phosphatase  89     U/L


 


C-Reactive Protein High


Sensitivity 


 0.10 


 


 0.00-0.50


MG/DL


 


Total Protein  7.3   6.4-8.2  GM/DL


 


Albumin  4.5   3.2-4.5  GM/DL


 


Lipase  28   8-78  U/L


 


Urine Color   YELLOW   


 


Urine Clarity   CLEAR   


 


Urine pH   6.0  5-9  


 


Urine Specific Gravity   >=1.030  1.016-1.022  


 


Urine Protein   TRACE H NEGATIVE  


 


Urine Glucose (UA)   NEGATIVE  NEGATIVE  


 


Urine Ketones   NEGATIVE  NEGATIVE  


 


Urine Nitrite   NEGATIVE  NEGATIVE  


 


Urine Bilirubin   NEGATIVE  NEGATIVE  


 


Urine Urobilinogen   0.2  < = 1.0  MG/DL


 


Urine Leukocyte Esterase   NEGATIVE  NEGATIVE  


 


Urine RBC (Auto)   NEGATIVE  NEGATIVE  


 


Urine RBC   NONE   /HPF


 


Urine WBC   NONE   /HPF


 


Urine Squamous Epithelial


Cells 


 


 NONE 


  /HPF





 


Urine Crystals   PRESENT H  /LPF


 


Urine Calcium Oxalate Crystals   FEW H  /LPF


 


Urine Bacteria   NEGATIVE   /HPF


 


Urine Casts   NONE   /LPF


 


Urine Mucus   NEGATIVE   /LPF


 


Urine Culture Indicated   NO   








My Orders





Orders - HARDEEP CHUNG MD


Ua Culture If Indicated (3/16/22 07:24)


Ondansetron Injection (Zofran Injectio (3/16/22 08:00)


Famotidine Injection (Pepcid Injection) (3/16/22 08:00)


Ed Iv/Invasive Line Start (3/16/22 07:48)


Lactated Ringers (Lr 1000 Ml Iv Solution (3/16/22 08:00)


Cbc With Automated Diff (3/16/22 07:48)


Comprehensive Metabolic Panel (3/16/22 07:48)


Hs C Reactive Protein (3/16/22 07:48)


Lipase (3/16/22 07:48)


Lidocaine 2% Viscous 15 Ml (Xylocaine Vi (3/16/22 08:45)


Antacid  Suspension (Mylanta  Suspension (3/16/22 08:45)


Fentanyl  Inj (Sublimaze Injection) (3/16/22 09:15)


Ct Abdomen/Pelvis W (3/16/22 09:09)


Iohexol Injection (Omnipaque 350 Mg/Ml 1 (3/16/22 09:30)


Received Contrast (Hold Metformin- Contr (3/16/22 09:30)


Sodium Chloride Flush (Catheter Flush Sy (3/16/22 09:30)


Ns (Ivpb) (Sodium Chloride 0.9% Ivpb Bag (3/16/22 09:30)


Fentanyl  Inj (Sublimaze Injection) (3/16/22 10:30)


Alcohol (3/16/22 10:32)


Pantoprazole Injection (Protonix Injecti (3/16/22 10:45)





Medications Given in ED





Current Medications








 Medications  Dose


 Ordered  Sig/Daniele


 Route  Start Time


 Stop Time Status Last Admin


Dose Admin


 


 Al Hydrox/Mg


 Hydrox/Simethicone  30 ml  ONCE  ONCE


 PO  3/16/22 08:45


 3/16/22 08:46 DC 3/16/22 08:39


30 ML


 


 Famotidine  20 mg  ONCE  ONCE


 IVP  3/16/22 08:00


 3/16/22 08:01 DC 3/16/22 07:55


20 MG


 


 Fentanyl Citrate  50 mcg  ONCE  ONCE


 IVP  3/16/22 09:15


 3/16/22 09:16 DC 3/16/22 09:16


50 MCG


 


 Iohexol  100 ml  ONCE  ONCE


 IV  3/16/22 09:30


 3/16/22 09:31 DC 3/16/22 09:34


100 ML


 


 Lactated Ringer's  1,000 ml @ 


 0 mls/hr  Q0M ONCE


 IV  3/16/22 08:00


 3/16/22 08:01 DC 3/16/22 07:56


1,000 MLS/HR


 


 Lidocaine HCl  15 ml  ONCE  ONCE


 PO  3/16/22 08:45


 3/16/22 08:46 DC 3/16/22 08:39


15 ML


 


 Ondansetron HCl  8 mg  ONCE  ONCE


 IVP  3/16/22 08:00


 3/16/22 08:01 DC 3/16/22 07:55


8 MG


 


 Sodium Chloride  10 ml  AS NEEDED  PRN


 IV  3/16/22 09:30


    3/16/22 09:34


10 ML


 


 Sodium Chloride  100 ml  ONCE  ONCE


 IV  3/16/22 09:30


 3/16/22 09:31 DC 3/16/22 09:34


80 ML








Vital Signs/I&O











 3/16/22





 07:30


 


Temp 35.3


 


Pulse 88


 


Resp 16


 


B/P (MAP) 167/105 (125)


 


Pulse Ox 98


 


O2 Delivery Room Air














Blood Pressure Mean:                    125











Progress


Progress Note #1:  


   Time:  08:20


Progress Note


Patient was treated with Pepcid, Zofran, and IV fluids.  Labs were obtained.


Progress Note #2:  


   Time:  08:35


Progress Note


Labs are unremarkable.  Nausea has improved.  Epigastric pain has not improved. 

A trial of GI cocktail has been ordered.  Determination on further work-up will 

be pending his response.


Progress Note #3:  


   Time:  09:10


Progress Note


Carter did not get much relief from GI cocktail.  Fentanyl has been ordered to 

further treat his pain.  We discussed options which included empiric treatment 

for gastroenteritis versus further evaluation with CT scan to explore the 

possibility of other pathologies.  He wished to proceed with a CT scan to 

further evaluate his pain.


Progress Note #4:  


   Time:  10:40


Progress Note


CT scan reveal dilatation of the pancreatic duct without evidence of 

pancreatitis.  It had a similar appearance on the CT scan performed  

last year.  This was discussed with Dr. Armendariz.  His impression is that this is a 

precursor to pancreatitis caused by excessive alcohol consumption.  He 

recommended PPI therapy and strict alcohol cessation.  I discussed this with the

patient.  He admits that his alcohol consumption over the past month has been 

intensified and he has been drinking nearly daily.  His last alcoholic beverage 

was on .  He has had problems with alcohol in the past and spent

4 years sober.  He resumed drinking after suffering a work injury.  He has been 

established with AA in the past and intends to go back.  We discussed options at

this point which included control of symptoms with oral medications and 

discharged home for outpatient follow-up and continuation of work-up versus 

admission for symptom control.  Patient is quite nervous about returning home 

and his pain rebounded quickly and intensely after the fentanyl dose he 

received.  He would appreciate a short-term observation admission to help him 

recover and manage his symptoms.  This seems very reasonable.  He has been given

Protonix in the ER and an additional fentanyl dose.  Dr. Armendariz states he will be 

available for consultation if needed while patient is in the hospital.  Patient 

does not have insurance which is a concern to him.  He is planning to complete 

the financial assistance paperwork and likely follow-up with [facility] or [facility] 

for outpatient endoscopy and outpatient monitoring.





Diagnostic Imaging





   Diagonstic Imaging:  CT


   Plain Films/CT/US/NM/MRI:  abdomen, pelvis


Comments


CT abdomen and pelvis viewed by me and report reviewed.  See report below:





NAME:   CARTER LANDIN II


MED REC#:   [id number]


ACCOUNT#:   [id number]


PT STATUS:   REG ER


:   1980


PHYSICIAN:   HARDEEP CHUNG MD


ADMIT DATE:   22/ER


***Draft***


Date of Exam:22





CT ABDOMEN/PELVIS W





PROCEDURE: CT abdomen and pelvis with contrast.





TECHNIQUE: Multiple contiguous axial images were obtained through


the abdomen and pelvis after administration of intravenous


contrast. Auto Exposure Controls were utilized during the CT exam


to meet ALARA standards for radiation dose reduction. All CT


scans use one or more of the following dose optimizing


techniques: automated exposure control, MA and/or KvP adjustment


based on patient size and exam type or iterative reconstruction.





INDICATION:  Upper abdominal pain, vomiting.





COMPARISON: 2021





FINDINGS: Bibasilar dependent atelectasis. The liver and spleen


are unremarkable. The adrenal glands are unremarkable. The


pancreatic duct is mildly dilated measuring near 4 mm throughout.


No definitive obstructing mass lesion. The common bile duct is


within normal limits and there is no abnormal dilatation of the


gallbladder. This is unchanged from the prior examination. The


pancreas is otherwise unremarkable. The gallbladder is


unremarkable. The kidneys and bilateral ureters are unremarkable.


Retroaortic left renal vein is noted. Mild scattered vascular


calcifications without aneurysmal dilatation of the abdominal


aorta. Small fat-containing umbilical hernia. The appendix is


unremarkable. The urinary bladder is unremarkable. Mural


thickening of the rectum without adjacent inflammatory stranding.


No bowel obstruction or pneumatosis. No significant adenopathy,


free air, or free fluid within the abdomen or pelvis. No new


acute osseous abnormality with chronic fracturing of a few right


transverse processes within the lumbar spine.





IMPRESSION: Borderline dilatation of the pancreatic duct, which


is unchanged from the prior examination. No definite obstructing


mass or stone identified on this examination. This is of


uncertain etiology or significance. This could simply be


physiologic for the patient. Recommend correlation with


laboratory values. If there is clinical concern for true ductal


obstruction and distention, then ERCP versus MRCP would be


recommended.





Poor distention of the rectum versus less likely proctitis. No


bowel obstruction.





Additional findings as above.





  Dictated on workstation # [id number]





Dict:   [id number]


Trans:   22 [id number]


 [id number]





Interpreted by:     GAVI SANTA MD





Departure


Communication (Admissions)


Time/Spoke to Admitting Phy:  10:35


Dr. Oconnell





Impression





   Primary Impression:  


   Upper abdominal pain


   Additional Impressions:  


   Nausea & vomiting


   Qualified Codes:  R11.2 - Nausea with vomiting, unspecified


   Alcohol dependence


   Qualified Codes:  F10.29 - Alcohol dependence with unspecified alcohol-

   induced disorder


   Dilation of pancreatic duct


Disposition:   ADMITTED AS INPATIENT


Condition:  Improved





Admissions


Decision to Admit Reason:  Admit from ER (General)


Decision to Admit/Date:  Mar 16, 2022


Time/Decision to Admit Time:  10:35





Departure-Patient Inst.


Referrals:  


DAISHA PETTIT MD (PCP/Family)


Primary Care Physician





Copy


Copies To 1:   DAISHA PETTIT MD, JOSHUA T MD        Mar 16, 2022 08:20

## 2022-03-16 NOTE — DIAGNOSTIC IMAGING REPORT
PROCEDURE: CT abdomen and pelvis with contrast.



TECHNIQUE: Multiple contiguous axial images were obtained through

the abdomen and pelvis after administration of intravenous

contrast. Auto Exposure Controls were utilized during the CT exam

to meet ALARA standards for radiation dose reduction. All CT

scans use one or more of the following dose optimizing

techniques: automated exposure control, MA and/or KvP adjustment

based on patient size and exam type or iterative reconstruction.



INDICATION:  Upper abdominal pain, vomiting.



COMPARISON: 04/27/2021



FINDINGS: Bibasilar dependent atelectasis. The liver and spleen

are unremarkable. The adrenal glands are unremarkable. The

pancreatic duct is mildly dilated measuring near 4 mm throughout.

No definitive obstructing mass lesion. The common bile duct is

within normal limits and there is no abnormal dilatation of the

gallbladder. This is unchanged from the prior examination. The

pancreas is otherwise unremarkable. The gallbladder is

unremarkable. The kidneys and bilateral ureters are unremarkable.

Retroaortic left renal vein is noted. Mild scattered vascular

calcifications without aneurysmal dilatation of the abdominal

aorta. Small fat-containing umbilical hernia. The appendix is

unremarkable. The urinary bladder is unremarkable. Mural

thickening of the rectum without adjacent inflammatory stranding.

No bowel obstruction or pneumatosis. No significant adenopathy,

free air, or free fluid within the abdomen or pelvis. No new

acute osseous abnormality with chronic fracturing of a few right

transverse processes within the lumbar spine.



IMPRESSION: Borderline dilatation of the pancreatic duct, which

is unchanged from the prior examination. No definite obstructing

mass or stone identified on this examination. This is of

uncertain etiology or significance. This could simply be

physiologic for the patient. Recommend correlation with

laboratory values. If there is clinical concern for true ductal

obstruction and distention, then ERCP versus MRCP would be

recommended.



Poor distention of the rectum versus less likely proctitis. No

bowel obstruction.



Additional findings as above.



Dictated by: 



  Dictated on workstation # [id number]

## 2022-03-17 VITALS — SYSTOLIC BLOOD PRESSURE: 101 MMHG | DIASTOLIC BLOOD PRESSURE: 60 MMHG

## 2022-03-17 VITALS — DIASTOLIC BLOOD PRESSURE: 69 MMHG | SYSTOLIC BLOOD PRESSURE: 107 MMHG

## 2022-03-17 VITALS — DIASTOLIC BLOOD PRESSURE: 68 MMHG | SYSTOLIC BLOOD PRESSURE: 110 MMHG

## 2022-03-17 VITALS — DIASTOLIC BLOOD PRESSURE: 67 MMHG | SYSTOLIC BLOOD PRESSURE: 104 MMHG

## 2022-03-17 VITALS — SYSTOLIC BLOOD PRESSURE: 117 MMHG | DIASTOLIC BLOOD PRESSURE: 76 MMHG

## 2022-03-17 VITALS — SYSTOLIC BLOOD PRESSURE: 112 MMHG | DIASTOLIC BLOOD PRESSURE: 56 MMHG

## 2022-03-17 VITALS — DIASTOLIC BLOOD PRESSURE: 68 MMHG | SYSTOLIC BLOOD PRESSURE: 114 MMHG

## 2022-03-17 LAB
ALBUMIN SERPL-MCNC: 3.7 GM/DL (ref 3.2–4.5)
ALP SERPL-CCNC: 69 U/L (ref 40–136)
ALT SERPL-CCNC: 12 U/L (ref 0–55)
BASOPHILS # BLD AUTO: 0 10^3/UL (ref 0–0.1)
BASOPHILS NFR BLD AUTO: 0 % (ref 0–10)
BILIRUB SERPL-MCNC: 0.7 MG/DL (ref 0.1–1)
BUN/CREAT SERPL: 8
CALCIUM SERPL-MCNC: 9.4 MG/DL (ref 8.5–10.1)
CHLORIDE SERPL-SCNC: 107 MMOL/L (ref 98–107)
CO2 SERPL-SCNC: 20 MMOL/L (ref 21–32)
CREAT SERPL-MCNC: 0.8 MG/DL (ref 0.6–1.3)
EOSINOPHIL # BLD AUTO: 0.1 10^3/UL (ref 0–0.3)
EOSINOPHIL NFR BLD AUTO: 2 % (ref 0–10)
GFR SERPLBLD BASED ON 1.73 SQ M-ARVRAT: 114 ML/MIN
GLUCOSE SERPL-MCNC: 90 MG/DL (ref 70–105)
HCT VFR BLD CALC: 40 % (ref 40–54)
HGB BLD-MCNC: 12.9 G/DL (ref 13.3–17.7)
LIPASE SERPL-CCNC: 16 U/L (ref 8–78)
LYMPHOCYTES # BLD AUTO: 2.9 10^3/UL (ref 1–4)
LYMPHOCYTES NFR BLD AUTO: 40 % (ref 12–44)
MANUAL DIFFERENTIAL PERFORMED BLD QL: NO
MCH RBC QN AUTO: 31 PG (ref 25–34)
MCHC RBC AUTO-ENTMCNC: 32 G/DL (ref 32–36)
MCV RBC AUTO: 96 FL (ref 80–99)
MONOCYTES # BLD AUTO: 0.6 10^3/UL (ref 0–1)
MONOCYTES NFR BLD AUTO: 9 % (ref 0–12)
NEUTROPHILS # BLD AUTO: 3.6 10^3/UL (ref 1.8–7.8)
NEUTROPHILS NFR BLD AUTO: 49 % (ref 42–75)
PLATELET # BLD: 302 10^3/UL (ref 130–400)
PMV BLD AUTO: 10.3 FL (ref 9–12.2)
POTASSIUM SERPL-SCNC: 3.7 MMOL/L (ref 3.6–5)
PROT SERPL-MCNC: 6 GM/DL (ref 6.4–8.2)
SODIUM SERPL-SCNC: 136 MMOL/L (ref 135–145)
WBC # BLD AUTO: 7.3 10^3/UL (ref 4.3–11)

## 2022-03-17 RX ADMIN — FENTANYL CITRATE PRN MCG: 50 INJECTION, SOLUTION INTRAMUSCULAR; INTRAVENOUS at 04:22

## 2022-03-17 RX ADMIN — FENTANYL CITRATE PRN MCG: 50 INJECTION, SOLUTION INTRAMUSCULAR; INTRAVENOUS at 18:24

## 2022-03-17 RX ADMIN — PANTOPRAZOLE SODIUM SCH MG: 40 INJECTION, POWDER, FOR SOLUTION INTRAVENOUS at 20:28

## 2022-03-17 RX ADMIN — FENTANYL CITRATE PRN MCG: 50 INJECTION, SOLUTION INTRAMUSCULAR; INTRAVENOUS at 08:20

## 2022-03-17 RX ADMIN — ASCORBIC ACID, VITAMIN A PALMITATE, CHOLECALCIFEROL, THIAMINE HYDROCHLORIDE, RIBOFLAVIN-5 PHOSPHATE SODIUM, PYRIDOXINE HYDROCHLORIDE, NIACINAMIDE, DEXPANTHENOL, ALPHA-TOCOPHEROL ACETATE, VITAMIN K1, FOLIC ACID, BIOTIN, CYANOCOBALAMIN SCH MLS/HR: 200; 3300; 200; 6; 3.6; 6; 40; 15; 10; 150; 600; 60; 5 INJECTION, SOLUTION INTRAVENOUS at 08:19

## 2022-03-17 RX ADMIN — FENTANYL CITRATE PRN MCG: 50 INJECTION, SOLUTION INTRAMUSCULAR; INTRAVENOUS at 02:06

## 2022-03-17 RX ADMIN — SODIUM CHLORIDE, SODIUM LACTATE, POTASSIUM CHLORIDE, AND CALCIUM CHLORIDE SCH MLS/HR: 600; 310; 30; 20 INJECTION, SOLUTION INTRAVENOUS at 06:46

## 2022-03-17 RX ADMIN — HYDROMORPHONE HYDROCHLORIDE PRN MG: 2 INJECTION, SOLUTION INTRAMUSCULAR; INTRAVENOUS; SUBCUTANEOUS at 20:28

## 2022-03-17 RX ADMIN — FENTANYL CITRATE PRN MCG: 50 INJECTION, SOLUTION INTRAMUSCULAR; INTRAVENOUS at 21:41

## 2022-03-17 RX ADMIN — SODIUM CHLORIDE, SODIUM LACTATE, POTASSIUM CHLORIDE, AND CALCIUM CHLORIDE SCH MLS/HR: 600; 310; 30; 20 INJECTION, SOLUTION INTRAVENOUS at 07:45

## 2022-03-17 RX ADMIN — PANTOPRAZOLE SODIUM SCH MG: 40 INJECTION, POWDER, FOR SOLUTION INTRAVENOUS at 08:18

## 2022-03-17 RX ADMIN — SODIUM CHLORIDE, SODIUM LACTATE, POTASSIUM CHLORIDE, AND CALCIUM CHLORIDE SCH MLS/HR: 600; 310; 30; 20 INJECTION, SOLUTION INTRAVENOUS at 15:24

## 2022-03-17 RX ADMIN — SODIUM CHLORIDE, SODIUM LACTATE, POTASSIUM CHLORIDE, AND CALCIUM CHLORIDE SCH MLS/HR: 600; 310; 30; 20 INJECTION, SOLUTION INTRAVENOUS at 21:41

## 2022-03-17 RX ADMIN — HYDROMORPHONE HYDROCHLORIDE PRN MG: 2 INJECTION, SOLUTION INTRAMUSCULAR; INTRAVENOUS; SUBCUTANEOUS at 13:48

## 2022-03-17 NOTE — PROGRESS NOTE
Subjective


Subjective/Events-last exam


Patient states that the pain is still there in his LUQ. He has had a BM and it 

did not affect the pain. Denies any blood in stool. No N/V. Would like to try 

and advance diet


Review of Systems


Pulmonary:  No Dyspnea, No Cough


Cardiovascular:  No: Chest Pain, Palpitations


Gastrointestinal:  Abdominal Pain; No: Nausea, Vomiting, Diarrhea, Constipation





Objective


Exam


Last Set of Vital Signs





Vital Signs








  Date Time  Temp Pulse Resp B/P (MAP) Pulse Ox O2 Delivery O2 Flow Rate FiO2


 


3/17/22 15:44 36.6 64 18 117/76 (90) 97 Room Air  





Capillary Refill : Less Than 3 Seconds


I&O











Intake and Output 


 


 3/17/22





 00:00


 


Intake Total 2400 ml


 


Balance 2400 ml


 


 


 


Intake Oral 1400 ml


 


IV Total 1000 ml


 


# Voids 5


 


Daily Weight Change No








General:  Alert, Oriented X3, Cooperative, No Acute Distress


Lungs:  Clear to Auscultation, Normal Air Movement


Heart:  Regular Rate, No Murmurs


Abdomen:  Normal Bowel Sounds, Soft, Other (LUQ ttp, no rebound or gaurding)


Extremities:  No Edema, No Tenderness/Swelling


Neuro:  Normal Speech, Sensation Intact, Cranial Nerves 3-12 NL


Psych/Mental Status:  Mental Status NL, Mood NL





Results/Procedures


Lab


Laboratory Tests


3/17/22 04:50: 


White Blood Count 7.3, Red Blood Count 4.22L, Hemoglobin 12.9L, Hematocrit 40, 

Mean Corpuscular Volume 96, Mean Corpuscular Hemoglobin 31, Mean Corpuscular 

Hemoglobin Concent 32, Red Cell Distribution Width 14.1, Platelet Count 302, 

Mean Platelet Volume 10.3, Immature Granulocyte % (Auto) 0, Neutrophils (%) 

(Auto) 49, Lymphocytes (%) (Auto) 40, Monocytes (%) (Auto) 9, Eosinophils (%) 

(Auto) 2, Basophils (%) (Auto) 0, Neutrophils # (Auto) 3.6, Lymphocytes # (Auto)

2.9, Monocytes # (Auto) 0.6, Eosinophils # (Auto) 0.1, Basophils # (Auto) 0.0, 

Immature Granulocyte # (Auto) 0.0, Sodium Level 136, Potassium Level 3.7, 

Chloride Level 107, Carbon Dioxide Level 20L, Anion Gap 9, Blood Urea Nitrogen 

6L, Creatinine 0.80, Estimat Glomerular Filtration Rate 114, BUN/Creatinine 

Ratio 8, Glucose Level 90, Calcium Level 9.4, Corrected Calcium 9.6, Total 

Bilirubin 0.7, Aspartate Amino Transf (AST/SGOT) 13, Alanine Aminotransferase 

(ALT/SGPT) 12, Alkaline Phosphatase 69, Total Protein 6.0L, Albumin 3.7, Lipase 

16


Radiology


Date of Exam:03/16/22





CT ABDOMEN/PELVIS W








PROCEDURE: CT abdomen and pelvis with contrast.





TECHNIQUE: Multiple contiguous axial images were obtained through


the abdomen and pelvis after administration of intravenous


contrast. Auto Exposure Controls were utilized during the CT exam


to meet ALARA standards for radiation dose reduction. All CT


scans use one or more of the following dose optimizing


techniques: automated exposure control, MA and/or KvP adjustment


based on patient size and exam type or iterative reconstruction.





INDICATION:  Upper abdominal pain, vomiting.





COMPARISON: 04/27/2021





FINDINGS: Bibasilar dependent atelectasis. The liver and spleen


are unremarkable. The adrenal glands are unremarkable. The


pancreatic duct is mildly dilated measuring near 4 mm throughout.


No definitive obstructing mass lesion. The common bile duct is


within normal limits and there is no abnormal dilatation of the


gallbladder. This is unchanged from the prior examination. The


pancreas is otherwise unremarkable. The gallbladder is


unremarkable. The kidneys and bilateral ureters are unremarkable.


Retroaortic left renal vein is noted. Mild scattered vascular


calcifications without aneurysmal dilatation of the abdominal


aorta. Small fat-containing umbilical hernia. The appendix is


unremarkable. The urinary bladder is unremarkable. Mural


thickening of the rectum without adjacent inflammatory stranding.


No bowel obstruction or pneumatosis. No significant adenopathy,


free air, or free fluid within the abdomen or pelvis. No new


acute osseous abnormality with chronic fracturing of a few right


transverse processes within the lumbar spine.





IMPRESSION: Borderline dilatation of the pancreatic duct, which


is unchanged from the prior examination. No definite obstructing


mass or stone identified on this examination. This is of


uncertain etiology or significance. This could simply be


physiologic for the patient. Recommend correlation with


laboratory values. If there is clinical concern for true ductal


obstruction and distention, then ERCP versus MRCP would be


recommended.





Poor distention of the rectum versus less likely proctitis. No


bowel obstruction.





Assessment/Plan


Assessment/Plan





(1) Upper abdominal pain


Status:  Acute


Assessment & Plan:  Described as sharp/burning sensation. Initially constant and

>10/10 in pain when it started at 0400. Has since changed to waxing and waning 

though still painful. Did have episode of red tinged vomit reported by patient. 

No history of EGD. Denies any significant NSAID use PTA. Dilated pancreatic duct

seen on CT w/o obvious stone or elevation in pancreatic enzymes. Concern for 

early pancreatitis





3/16 - started on IVF and protonix 40mg BID in the ED. May consider EGD to 

assess for signs of ulceration given history of alcohol and reported red tinged 

vomit. If nausea permits, may trial patient on clear fluids and progress as 

tolerated given no elevation in pancreatic enzymes for acute pancreatitis. 





3/17: Will get Abd US and consider General surgery consult





(2) Dilation of pancreatic duct


Status:  Acute


Assessment & Plan:  Uncertain etiology whether early signs of developing 

pancreatitis vs normal anatomical variant for this patient. Given history of 

alcohol abuse will start patient on PPI, IVF and recommend cessation of alcohol.







(3) Alcohol abuse


Status:  Chronic


Assessment & Plan:  Pt admits to being 4 years sober prior to restarting 

drinking within the last 10 weeks following a rib fracture. Endorses 1/2 pint to

a pint for 4-5 days per week. Does not have history of withdrawal seizures but 

does get shaky and tachycardic when withdrawing from alcohol. Saw AA on tuesday 

prior to coming to hospital. Knows he needs to go back to  after d/c





3/16 - serum alcohol <10. Last reported drink on 3/14. Will monitor for any 

signs of withdrawal while admitted with [facility]. supplementing with thiamine, 

folate, mg in IVF














SANTY STARR MD               Mar 17, 2022 16:06

## 2022-03-18 VITALS — DIASTOLIC BLOOD PRESSURE: 65 MMHG | SYSTOLIC BLOOD PRESSURE: 118 MMHG

## 2022-03-18 VITALS — DIASTOLIC BLOOD PRESSURE: 75 MMHG | SYSTOLIC BLOOD PRESSURE: 121 MMHG

## 2022-03-18 VITALS — SYSTOLIC BLOOD PRESSURE: 101 MMHG | DIASTOLIC BLOOD PRESSURE: 62 MMHG

## 2022-03-18 VITALS — DIASTOLIC BLOOD PRESSURE: 56 MMHG | SYSTOLIC BLOOD PRESSURE: 118 MMHG

## 2022-03-18 LAB
BASOPHILS # BLD AUTO: 0 10^3/UL (ref 0–0.1)
BASOPHILS NFR BLD AUTO: 1 % (ref 0–10)
BUN/CREAT SERPL: 7
CALCIUM SERPL-MCNC: 9.3 MG/DL (ref 8.5–10.1)
CHLORIDE SERPL-SCNC: 107 MMOL/L (ref 98–107)
CO2 SERPL-SCNC: 21 MMOL/L (ref 21–32)
CREAT SERPL-MCNC: 0.76 MG/DL (ref 0.6–1.3)
EOSINOPHIL # BLD AUTO: 0.2 10^3/UL (ref 0–0.3)
EOSINOPHIL NFR BLD AUTO: 3 % (ref 0–10)
GFR SERPLBLD BASED ON 1.73 SQ M-ARVRAT: 116 ML/MIN
GLUCOSE SERPL-MCNC: 86 MG/DL (ref 70–105)
HCT VFR BLD CALC: 40 % (ref 40–54)
HGB BLD-MCNC: 13 G/DL (ref 13.3–17.7)
LYMPHOCYTES # BLD AUTO: 2 10^3/UL (ref 1–4)
LYMPHOCYTES NFR BLD AUTO: 31 % (ref 12–44)
MANUAL DIFFERENTIAL PERFORMED BLD QL: NO
MCH RBC QN AUTO: 31 PG (ref 25–34)
MCHC RBC AUTO-ENTMCNC: 32 G/DL (ref 32–36)
MCV RBC AUTO: 97 FL (ref 80–99)
MONOCYTES # BLD AUTO: 0.6 10^3/UL (ref 0–1)
MONOCYTES NFR BLD AUTO: 10 % (ref 0–12)
NEUTROPHILS # BLD AUTO: 3.6 10^3/UL (ref 1.8–7.8)
NEUTROPHILS NFR BLD AUTO: 55 % (ref 42–75)
PLATELET # BLD: 327 10^3/UL (ref 130–400)
PMV BLD AUTO: 10.7 FL (ref 9–12.2)
POTASSIUM SERPL-SCNC: 3.8 MMOL/L (ref 3.6–5)
SODIUM SERPL-SCNC: 139 MMOL/L (ref 135–145)
WBC # BLD AUTO: 6.5 10^3/UL (ref 4.3–11)

## 2022-03-18 RX ADMIN — Medication SCH MG: at 06:02

## 2022-03-18 RX ADMIN — FENTANYL CITRATE PRN MCG: 50 INJECTION, SOLUTION INTRAMUSCULAR; INTRAVENOUS at 10:24

## 2022-03-18 RX ADMIN — SODIUM CHLORIDE, SODIUM LACTATE, POTASSIUM CHLORIDE, AND CALCIUM CHLORIDE SCH MLS/HR: 600; 310; 30; 20 INJECTION, SOLUTION INTRAVENOUS at 10:06

## 2022-03-18 RX ADMIN — FENTANYL CITRATE PRN MCG: 50 INJECTION, SOLUTION INTRAMUSCULAR; INTRAVENOUS at 06:02

## 2022-03-18 RX ADMIN — PANTOPRAZOLE SODIUM SCH MG: 40 INJECTION, POWDER, FOR SOLUTION INTRAVENOUS at 08:45

## 2022-03-18 RX ADMIN — SODIUM CHLORIDE, SODIUM LACTATE, POTASSIUM CHLORIDE, AND CALCIUM CHLORIDE SCH MLS/HR: 600; 310; 30; 20 INJECTION, SOLUTION INTRAVENOUS at 06:02

## 2022-03-18 RX ADMIN — Medication SCH MG: at 06:05

## 2022-03-18 NOTE — DIAGNOSTIC IMAGING REPORT
PROCEDURE: US Gallbladder.



TECHNIQUE: Multiple real-time grayscale images were obtained over

the right upper quadrant in various projections.



INDICATION:  Upper abdominal pain with nausea and vomiting.



Liver is mildly enlarged at 18.6 cm. The portal vein is patent

and shows normal direction of flow. No liver mass is detected.

Gallbladder is without stones or sludge. There is no wall

thickening or biliary duct dilatation. The visualized pancreas is

unremarkable. Aorta is nonaneurysmal. IVC is patent. Right kidney

is without calculi or hydronephrosis. There is no ascites.



IMPRESSION:

1. Mild hepatomegaly.

2. No evidence of cholelithiasis or acute cholecystitis.



Dictated by: 



  Dictated on workstation # [id number]

## 2022-03-18 NOTE — DISCHARGE SUMMARY
Discharge [facility]


Reconcile Patient Problems


Problems Reviewed?:  Yes





Discharge Medications


New, Converted or Re-Newed RX:  Transmitted to Pharmacy


New Medications:  


Pantoprazole Sodium (Protonix) 40 Mg Tablet.


40 MG PO DAILY, #30 TAB











Patient Instructions


Goal/Follow Up Appt:  


[place] with PCP Dr Godinez in 1-2 weeks


Patient Instructions:  


Discussed the importance of continued cessation of EtOH





Would recommend EGD as outpatient





Activity & Diet


Discharge Diet:  Eat Small Frequent Meals


Activity as Tolerated:  Yes











SANTY STARR MD               Mar 18, 2022 11:36

## 2022-03-18 NOTE — DISCHARGE SUMMARY
Diagnosis/Chief Complaint


Date of Admission


Mar 16, 2022 at 10:35


Date of Discharge


3/18/22


Admission Diagnosis


Admission Diagnosis


See problem list





Discharge Diagnosis


See below


Problems/Diagnosis:  


(1) Upper abdominal pain


Assessment & Plan:  Described as sharp/burning sensation. Initially constant and

>10/10 in pain when it started at 0400. Has since changed to waxing and waning 

though still painful. Did have episode of red tinged vomit reported by patient. 

No history of EGD. Denies any significant NSAID use PTA. Dilated pancreatic duct

seen on CT w/o obvious stone or elevation in pancreatic enzymes. Concern for 

early pancreatitis





3/16 - started on IVF and protonix 40mg BID in the ED. May consider EGD to 

assess for signs of ulceration given history of alcohol and reported red tinged 

vomit. If nausea permits, may trial patient on clear fluids and progress as 

tolerated given no elevation in pancreatic enzymes for acute pancreatitis. 





3/17: Will get Abd US and consider General surgery consult


Status:  Acute


(2) Dilation of pancreatic duct


Assessment & Plan:  Uncertain etiology whether early signs of developing 

pancreatitis vs normal anatomical variant for this patient. Given history of 

alcohol abuse will start patient on PPI, IVF and recommend cessation of alcohol.


Status:  Acute


(3) Alcohol abuse


Assessment & Plan:  Pt admits to being 4 years sober prior to restarting 

drinking within the last 10 weeks following a rib fracture. Endorses 1/2 pint to

a pint for 4-5 days per week. Does not have history of withdrawal seizures but 

does get shaky and tachycardic when withdrawing from alcohol. Saw  on tuesday 

prior to coming to hospital. Knows he needs to go back to  after d/c





3/16 - serum alcohol <10. Last reported drink on 3/14. Will monitor for any 

signs of withdrawal while admitted with [facility]. supplementing with thiamine, 

folate, mg in IVF


Status:  Chronic





Chief Complaint/HPI


Chief Complaint/HPI


Pt is a 42yo male w/ pmhx of ADD/ADHD, bipolar, depression and alcohol abuse 

presents to the ED for complaints of sharp/burning abdominal pain that started 

at 0400 this AM. Pt states that it woke him from sleep, describes it as a 

sharp/burning sensation located in the epigastric region w/ radiation to the 

rest of his abdomen. Does endorse significant diaphoresis, tachycardia, 

shortness of breath and tightening of his back muscles in response to the pain. 

States it was a constant pain for about 30 minutes then would wax and wane as he

changed position. Could not find a comfortable position to keep the pain 

completely away. Notes that he was able to pass gas and have a small BM however 

this did not alleviate the symptoms. Was previously sober for 4 years but 

started drinking again within the last 10 weeks following a broken rip. Endorses

1/2 pint - pint of alcohol per day. Last drink monday. Went to AA on tuesday and

was supposed to go again today however pain was too severe and decided to go to 

ED. Notes that he vomited in the ED and comments that it was red in color though

he had only had clear liquids prior to this.





Discharge Summary-Simple/Stand


Consultations





Discharge Physical Examination


Allergies:  


Coded Allergies:  


     prednisone (Verified  Allergy, Unknown, 2/3/10)


Vitals & I&Os





Vital Sign - Last 12Hours








  Date Time  Temp Pulse Resp B/P (MAP) Pulse Ox O2 Delivery O2 Flow Rate FiO2


 


3/18/22 08:00     98 Room Air  


 


3/18/22 08:00 37.1 62 18 121/75 (90)    














Intake and Output 


 


 3/18/22





 00:00


 


Intake Total 2610 ml


 


Balance 2610 ml











Hospital Course


See final discharge diagnosis.


Radiology Reviewed


Date of Exam:03/16/22





CT ABDOMEN/PELVIS W








PROCEDURE: CT abdomen and pelvis with contrast.





TECHNIQUE: Multiple contiguous axial images were obtained through


the abdomen and pelvis after administration of intravenous


contrast. Auto Exposure Controls were utilized during the CT exam


to meet ALARA standards for radiation dose reduction. All CT


scans use one or more of the following dose optimizing


techniques: automated exposure control, MA and/or KvP adjustment


based on patient size and exam type or iterative reconstruction.





INDICATION:  Upper abdominal pain, vomiting.





COMPARISON: 04/27/2021





FINDINGS: Bibasilar dependent atelectasis. The liver and spleen


are unremarkable. The adrenal glands are unremarkable. The


pancreatic duct is mildly dilated measuring near 4 mm throughout.


No definitive obstructing mass lesion. The common bile duct is


within normal limits and there is no abnormal dilatation of the


gallbladder. This is unchanged from the prior examination. The


pancreas is otherwise unremarkable. The gallbladder is


unremarkable. The kidneys and bilateral ureters are unremarkable.


Retroaortic left renal vein is noted. Mild scattered vascular


calcifications without aneurysmal dilatation of the abdominal


aorta. Small fat-containing umbilical hernia. The appendix is


unremarkable. The urinary bladder is unremarkable. Mural


thickening of the rectum without adjacent inflammatory stranding.


No bowel obstruction or pneumatosis. No significant adenopathy,


free air, or free fluid within the abdomen or pelvis. No new


acute osseous abnormality with chronic fracturing of a few right


transverse processes within the lumbar spine.





IMPRESSION: Borderline dilatation of the pancreatic duct, which


is unchanged from the prior examination. No definite obstructing


mass or stone identified on this examination. This is of


uncertain etiology or significance. This could simply be


physiologic for the patient. Recommend correlation with


laboratory values. If there is clinical concern for true ductal


obstruction and distention, then ERCP versus MRCP would be


recommended.





Poor distention of the rectum versus less likely proctitis. No


bowel obstruction.





Discharge


Instructions to patient/family


Please see electronic discharge instructions given to patient.


Discharge Medications


Reviewed and agree with Discharge Medication list on patient's Discharge 

Instruction sheet











SANTY STARR MD               Mar 18, 2022 11:33

## 2022-04-04 ENCOUNTER — HOSPITAL ENCOUNTER (EMERGENCY)
Dept: HOSPITAL 75 - ER | Age: 42
Discharge: LEFT BEFORE DISCHARGE | End: 2022-04-04
Payer: SELF-PAY

## 2022-04-04 VITALS — DIASTOLIC BLOOD PRESSURE: 76 MMHG | SYSTOLIC BLOOD PRESSURE: 145 MMHG

## 2022-04-04 VITALS — BODY MASS INDEX: 25.71 KG/M2 | HEIGHT: 72.83 IN | WEIGHT: 194.01 LBS

## 2022-04-04 DIAGNOSIS — M79.671: Primary | ICD-10-CM

## 2022-04-04 PROCEDURE — 99281 EMR DPT VST MAYX REQ PHY/QHP: CPT

## 2022-08-05 ENCOUNTER — HOSPITAL ENCOUNTER (EMERGENCY)
Dept: HOSPITAL 75 - ER | Age: 42
Discharge: LEFT BEFORE DISCHARGE | End: 2022-08-05
Payer: SELF-PAY

## 2022-08-05 VITALS — SYSTOLIC BLOOD PRESSURE: 134 MMHG | DIASTOLIC BLOOD PRESSURE: 89 MMHG

## 2022-08-05 VITALS — WEIGHT: 190.04 LBS | BODY MASS INDEX: 25.19 KG/M2 | HEIGHT: 73.03 IN

## 2022-08-05 DIAGNOSIS — M25.561: Primary | ICD-10-CM

## 2022-08-05 DIAGNOSIS — F10.129: ICD-10-CM

## 2022-08-05 PROCEDURE — 99283 EMERGENCY DEPT VISIT LOW MDM: CPT

## 2022-08-29 ENCOUNTER — HOSPITAL ENCOUNTER (EMERGENCY)
Dept: HOSPITAL 75 - ER | Age: 42
Discharge: LEFT BEFORE DISCHARGE | End: 2022-08-29
Payer: SELF-PAY

## 2022-08-29 VITALS — BODY MASS INDEX: 25.06 KG/M2 | HEIGHT: 71.65 IN | WEIGHT: 182.98 LBS

## 2022-08-29 VITALS — SYSTOLIC BLOOD PRESSURE: 126 MMHG | DIASTOLIC BLOOD PRESSURE: 69 MMHG

## 2022-08-29 DIAGNOSIS — F17.200: ICD-10-CM

## 2022-08-29 DIAGNOSIS — F10.10: Primary | ICD-10-CM

## 2022-08-29 DIAGNOSIS — Z28.310: ICD-10-CM

## 2022-08-29 PROCEDURE — 99281 EMR DPT VST MAYX REQ PHY/QHP: CPT

## 2022-08-29 NOTE — ED GENERAL
General


Chief Complaint:  Detox


Stated Complaint:  DETOX


Nursing Triage Note:  


ARRIVED VIA AMB TO ROOM 04 ET STATES HE HAS BEEN DRINKING VODKA FOR THE LAST 2 


WEEKS AND WANTS TO STOP.  STATES HE WAS IN THE [place] UNIT 2 WEEKS AGO AND IS 


SCHEDULED FOR ATC NEXT TUESDAY.


Source of Information:  Patient


Exam Limitations:  No Limitations





History of Present Illness


Date Seen by Provider:  Aug 29, 2022


Time Seen by Provider:  17:25


Initial Comments


42-year-old male requesting alcohol detox.  He states he just got out of the 

[place] unit and the night he got out he went and drank.  He has not drank since

9:00 this morning requesting help with his drinking.  He does have an outpatient

detox follow-up scheduled for next Tuesday.  He drinks vodka when he drinks, 

upwards of a liter a day.  No medical concerns at this time





Allergies and Home Medications


Allergies


Coded Allergies:  


     prednisone (Verified  Allergy, Unknown, 8/8/22)





Patient Home Medication List


Home Medication List Reviewed:  Yes


Pantoprazole Sodium (Protonix) 40 Mg Tablet.dr, 40 MG PO DAILY


   Prescribed by: LIZ KRAMER on 3/18/22 1135





Review of Systems


Review of Systems


Constitutional:  no symptoms reported


EENTM:  no symptoms reported


Respiratory:  no symptoms reported


Cardiovascular:  no symptoms reported


Genitourinary:  no symptoms reported


Musculoskeletal:  no symptoms reported


Skin:  no symptoms reported


Psychiatric/Neurological:  No Symptoms Reported


Hematologic/Lymphatic:  No Symptoms Reported


Immunological/Allergic:  no symptoms reported





Past Medical-Social-Family Hx


Patient Social History


Tobacco Use?:  Yes


Smoking Status:  Current Everyday Smoker


Substance use?:  Yes


Substance type:  Marijuana


Alcohol Use?:  Yes


Alcohol type:  Hard Liquor


Alcohol Frequency:  Daily





Immunizations Up To Date


Tetanus Booster (TDap):  Unknown





Seasonal Allergies


Seasonal Allergies:  No





Past Medical History


Surgery/Hospitalization HX:  


ORTHO SX. PMH: BIPOLAR


Surgeries:  Yes (LT KNEE AND QUAD, RT SHOULDER, RT KNEE, LT WRIST, JAW 

RECONSTRUCTION)


Orthopedic


Respiratory:  No


Cardiac:  Yes


Endocarditis


Neurological:  No


Reproductive Disorders:  No


Gastrointestinal:  No


Musculoskeletal:  Yes


Fractures


Endocrine:  No


HEENT:  No


Cancer:  No


Psychosocial:  Yes


ADD/ADHD, Anxiety, Bipolar, Depression


Integumentary:  No


Blood Disorders:  No





Family Medical History


Reviewed Nursing Family Hx





Patient reports no known family medical history.


No Pertinent Family Hx





Physical Exam


Vital Signs





Vital Signs - First Documented








 8/29/22





 17:15


 


Temp 37.3


 


Pulse 81


 


Resp 16


 


B/P (MAP) 126/69 (88)


 


Pulse Ox 96


 


O2 Delivery Room Air





Capillary Refill : Less Than 3 Seconds


Height, Weight, BMI


Height: 6'1"


Weight: 188lbs. 6.4oz. [id number]; 25.00 BMI


Method:Estimated


General Appearance:  No Apparent Distress, WD/WN


Eyes:  Bilateral Eye Normal Inspection, Bilateral Eye PERRL, Bilateral Eye EOMI,

Bilateral Eye Abnormal EOM, Bilateral Eye Abnormal Pupil, Bilateral Eye 

Conjunctivae Pale, Bilateral Eye Lid Inflammation, Bilateral Eye Photophobia, 

Bilateral Eye Scleral Icterus, Bilateral Eye Other


HEENT:  PERRL/EOMI, TMs Normal, Normal ENT Inspection, Pharynx Normal


Neck:  Normal Inspection, Non Tender, Supple


Respiratory:  Chest Non Tender, Lungs Clear, Normal Breath Sounds, No Accessory 

Muscle Use, No Respiratory Distress


Cardiovascular:  Regular Rate, Rhythm, No Edema, No Gallop, No JVD, No Murmur, 

Normal Peripheral Pulses


Gastrointestinal:  Normal Bowel Sounds, No Organomegaly, No Pulsatile Mass, Non 

Tender, Soft


Back:  Normal Inspection, No CVA Tenderness, No Vertebral Tenderness


Extremity:  Normal Capillary Refill, Normal Inspection, Normal Range of Motion, 

Non Tender, No Calf Tenderness


Neurologic/Psychiatric:  Alert, Oriented x3, No Motor/Sensory Deficits


Skin:  Normal Color, Warm/Dry





Procedures/Interventions





   Suture Size:  5-0





Progress/Results/Core Measures


Suspected Sepsis


SIRS


Temperature: 


Pulse: 81 


Respiratory Rate: 16


 


Blood Pressure 126 /69 


Mean: 88





Results/Orders


Vital Signs/I&O











 8/29/22





 17:15


 


Temp 37.3


 


Pulse 81


 


Resp 16


 


B/P (MAP) 126/69 (88)


 


Pulse Ox 96


 


O2 Delivery Room Air





Capillary Refill : Less Than 3 Seconds








Blood Pressure Mean:                    88











Departure


Communication (Admissions)


Patient is hemodynamically stable.  Requesting inpatient alcohol detox.  He just

went through an intensive alcohol detox receiving unit in [place] during the night he

was released.  I advised him he did not qualify for any inpatient treatment at 

this time.  He has normal vital signs no evidence of withdrawal at this time.  

No history of delirium tremens or seizure with withdrawal.  He requests to leave

once I told him there is no medical indication for admission at this time.  I 

did offer to check labs and see if there is a medical indication for admission 

and he declines and leaves without his discharge instructions.





Impression





   Primary Impression:  


   Alcohol abuse


Disposition:  01 HOME, SELF-CARE


Condition:  Stable





Departure-Patient Inst.


Referrals:  


NO,LOCAL PHYSICIAN (PCP/Family)


Primary Care Physician


Patient Instructions:  Alcohol Use Disorder (DC)





Add. Discharge Instructions:  


Keep your detox appointment next week on Tuesday.





All discharge instructions reviewed with patient and/or family. Voiced 

understanding.











CELSO WAY DO            Aug 29, 2022 17:37

## 2023-01-24 ENCOUNTER — HOSPITAL ENCOUNTER (OUTPATIENT)
Dept: HOSPITAL 75 - RAD | Age: 43
End: 2023-01-24
Attending: INTERNAL MEDICINE
Payer: COMMERCIAL
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
## 2023-01-24 DIAGNOSIS — Q66.89: ICD-10-CM

## 2023-01-24 DIAGNOSIS — S99.812A: ICD-10-CM

## 2023-01-24 DIAGNOSIS — S93.492A: Primary | ICD-10-CM

## 2023-01-24 PROCEDURE — 73721 MRI JNT OF LWR EXTRE W/O DYE: CPT

## 2023-01-24 NOTE — DIAGNOSTIC IMAGING REPORT
PROCEDURE: MRI left joint lower extremity without contrast.



TECHNIQUE: Multiplanar, multisequence non contrast-enhanced MRI

of the left ankle was accomplished.



INDICATION:  Ankle pain.



COMPARISON: 08/20/2021.



FINDINGS:



Tendons: Achilles is intact. The peroneus longus longus and

brevis tendons are intact. Posterior tibialis, flexor digitorum

longus and flexor hallux longus are intact. Mild tenosynovitis of

the posterior tibialis is noted. Anterior tibialis, extensor

hallux longus and extensor digitorum longus are normal where

visualized.



Ligaments: The anterior and posterior distal tibiofibular

ligaments are intact. Anterior talofibular ligament is not seen

and likely chronically torn. The calcaneofibular and posterior

talofibular are normal. Medial deltoid ligamentous complex is

intact.



Bones: There are 2 separate sites of abnormal subchondral bone

marrow edema in the medial and lateral aspects of the talar dome.

At the medial talar dome, there is some irregularity of the

overlying articular cartilage and this is most compatible with a

osteochondral injury. However, there are no large subchondral

cysts or fluidlike signal to indicate instability at either of

the sites of osteochondral injury. No stress fracture within the

calcaneus. Nonosseous coalition of the calcaneonavicular joint.



Soft tissues: No ankle joint effusion. Sinus tarsi is normal in

appearance. Plantar fascia origin is normal. No mass effect on

the tarsal tunnel.



IMPRESSION:

1. There are 2 separate osteochondral injuries in the medial and

lateral aspect of the talar dome. However, there are no MRI

features to indicate unstable osteochondral defects.

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

3. Nonosseous calcaneonavicular coalition.



Dictated by: 



  Dictated on workstation # [id number]

## 2023-04-20 ENCOUNTER — HOSPITAL ENCOUNTER (EMERGENCY)
Dept: HOSPITAL 75 - ER | Age: 43
Discharge: HOME | End: 2023-04-20
Payer: COMMERCIAL

## 2023-04-20 VITALS — SYSTOLIC BLOOD PRESSURE: 124 MMHG | DIASTOLIC BLOOD PRESSURE: 76 MMHG

## 2023-04-20 VITALS — HEIGHT: 72.83 IN | BODY MASS INDEX: 28.34 KG/M2 | WEIGHT: 213.85 LBS

## 2023-04-20 DIAGNOSIS — S76.112A: ICD-10-CM

## 2023-04-20 DIAGNOSIS — S76.111A: Primary | ICD-10-CM

## 2023-04-20 DIAGNOSIS — Z79.899: ICD-10-CM
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
## 2023-04-20 DIAGNOSIS — Z28.310: ICD-10-CM

## 2023-04-20 DIAGNOSIS — F17.210: ICD-10-CM

## 2023-04-20 PROCEDURE — 99284 EMERGENCY DEPT VISIT MOD MDM: CPT

## 2023-04-20 NOTE — ED LOWER EXTREMITY
General


Chief Complaint:  Lower Extremity


Stated Complaint:  LEG PAIN


Nursing Triage Note:  


PT REPORTS TO ED FOR LEFT LEG QUAD. PAIN. PER PT HE PULLED THE MUSCLE WHEN HE 


STEPPED UP A DITCH. PT WAS GOING INTO HIS HOUSE. PT HAS BEEN TRYING OTC STUFF AT




HOME WITH NO RELIEF. HE DOES NOT WANT NARCOTICS BUT WOULD LIKE SOMETHING TO HELP




TAKE THE INFLAMMATION FASTER AS HE IS LOOKING TO GET A JOB. PT AMB. TO [facility] 


WITHOUT DIFFICULTY.


Source:  patient


Exam Limitations:  no limitations





History of Present Illness


Date Seen by Provider:  Apr 20, 2023


Time Seen by Provider:  19:26


Initial Comments


42-year-old male presents to the ED with complaints of muscle strain to 

bilateral quads.  He states that he pulled his right upper quad 2 weeks ago, 4 

days later he then pulled his left upper quad.  He states that he has been 

stretching, using ice, Biofreeze, ibuprofen.  He reports that the right leg is 

feeling better, but the left is still having a lot of pain.  He is requesting 

something to help his muscles heal faster because he just got started with a new

job, and he does not want to miss work.  Patient currently takes lithium, 

Vyvanse, Seroquel, and mirtazapine.





Allergies and Home Medications


Allergies


Coded Allergies:  


     prednisone (Verified  Allergy, Unknown, 8/8/22)





Patient Home Medication List


Home Medication List Reviewed:  Yes


Cyclobenzaprine HCl (Cyclobenzaprine HCl) 10 Mg Tablet, 10 MG PO TID


   Prescribed by: Venice Langford on 4/20/23 1942


Ketorolac Tromethamine (Ketorolac Tromethamine) 10 Mg Tablet, 10 MG PO Q6H PRN 

for PAIN


   Prescribed by: Venice Langford on 4/20/23 1942


Pantoprazole Sodium (Protonix) 40 Mg Tablet.dr, 40 MG PO DAILY


   Prescribed by: LIZ KRAMER on 3/18/22 1135





Review of Systems


Constitutional:  no symptoms reported


Musculoskeletal:  muscle pain





Past Medical-Social-Family Hx


Patient Social History


Tobacco Use?:  Yes


Tobacco type used:  Cigarettes


Smoking Status:  Current Everyday Smoker


Use of E-Cig and/or Vaping dev:  No


Substance use?:  No


Alcohol Use?:  No


Pt feels they are or have been:  No





Immunizations Up To Date


Tetanus Booster (TDap):  Unknown





Seasonal Allergies


Seasonal Allergies:  No





Past Medical History


Surgery/Hospitalization HX:  


ORTHO SX. PMH: BIPOLAR


Surgeries:  Yes (LT KNEE AND QUAD, RT SHOULDER, RT KNEE, LT WRIST, JAW 

RECONSTRUCTION)


Orthopedic


Respiratory:  No


Cardiac:  Yes


Endocarditis


Neurological:  No


Reproductive Disorders:  No


Gastrointestinal:  No


Musculoskeletal:  Yes


Fractures


Endocrine:  No


HEENT:  No


Cancer:  No


Psychosocial:  Yes


ADD/ADHD, Anxiety, Bipolar, Depression


Integumentary:  No


Blood Disorders:  No





Family Medical History





Patient reports no known family medical history.


No Pertinent Family Hx





Physical Exam


Vital Signs





Vital Signs - First Documented








 4/20/23





 19:19


 


Temp 36.7


 


Pulse 87


 


Resp 18


 


B/P (MAP) 129/80 (96)


 


Pulse Ox 98


 


O2 Delivery Room Air





Capillary Refill : Less Than 3 Seconds


Height, Weight, BMI


Height: 6'1"


Weight: 188lbs. 6.4oz. [id number]; 28.00 BMI


Method:Estimated


General Appearance:  WD/WN, no apparent distress


Neck:  supple, normal inspection


Cardiovascular:  regular rate, rhythm, no edema, no gallop, no JVD, no murmur


Respiratory:  lungs clear, normal breath sounds, no respiratory distress, no 

accessory muscle use


Legs:  bilateral leg normal inspection, bilateral leg normal range of motion, 

bilateral leg pain


Neurologic/Psychiatric:  alert, normal mood/affect


Skin:  normal color, warm/dry





Procedures/Interventions





   Suture Size:  5-0





Progress/Results/Core Measures


Results/Orders


My Orders





Orders - VENICE LANGFORD


Ketorolac Injection (Toradol Injection) (4/20/23 19:45)


Orphenadrine Inj (Ed Only) (Norflex Inje (4/20/23 19:45)





Vital Signs/I&O











 4/20/23 4/20/23





 19:19 20:00


 


Temp 36.7 


 


Pulse 87 80


 


Resp 18 


 


B/P (MAP) 129/80 (96) 124/76


 


Pulse Ox 98 


 


O2 Delivery Room Air 














Blood Pressure Mean:                    96











Progress


Progress Note :  


   Time:  19:38


Progress Note


Patient seen and evaluated, resting comfortably in recliner, no acute distress. 

Based on exam and symptoms, will treat with Toradol and Norflex.  Will discharge

with prescription for Toradol and Flexeril.  Discharge instructions and return 

precautions provided.





Departure


Impression





   Primary Impression:  


   Muscle strain


Disposition:  01 HOME, SELF-CARE


Condition:  Stable





Departure-Patient Inst.


Decision time for Depature:  19:39


Referrals:  


CECILE ORELLANA MD (PCP/Family)


Primary Care Physician


Patient Instructions:  Muscle Strain (DC)





Add. Discharge Instructions:  


Take Toradol as needed for pain and inflammation. Do not take ibuprofen with 

this.


You may also take Tylenol as needed for pain.


Try using heat, this may work better for you.


Continue stretching the muscles.


Take Flexeril as needed for pain/muscle spasm, it may make you sleepy, so you 

may only want to take it at night.


Follow-up with primary care provider.


Return for any new, concerning, or worsening symptoms.





All discharge instructions reviewed with patient and/or family. Voiced 

understanding.


Scripts


Cyclobenzaprine HCl (Cyclobenzaprine HCl) 10 Mg Tablet


10 MG PO TID, #21 TAB 0 Refills


   Prov: VENICE LANGFORD         4/20/23 


Ketorolac Tromethamine (Ketorolac Tromethamine) 10 Mg Tablet


10 MG PO Q6H PRN for PAIN, #28 TAB 0 Refills


   Prov: VENICE LANGFORD         4/20/23











VENICE LANGFORD        Apr 20, 2023 19:42

## 2023-05-20 ENCOUNTER — HOSPITAL ENCOUNTER (EMERGENCY)
Dept: HOSPITAL 75 - ER | Age: 43
Discharge: HOME | End: 2023-05-20
Payer: COMMERCIAL

## 2023-05-20 VITALS — SYSTOLIC BLOOD PRESSURE: 127 MMHG | DIASTOLIC BLOOD PRESSURE: 81 MMHG

## 2023-05-20 VITALS — WEIGHT: 220.02 LBS | HEIGHT: 72.99 IN | BODY MASS INDEX: 29.16 KG/M2

## 2023-05-20 DIAGNOSIS — Z20.822: ICD-10-CM

## 2023-05-20 DIAGNOSIS — F17.200: ICD-10-CM
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
## 2023-05-20 DIAGNOSIS — H60.11: ICD-10-CM

## 2023-05-20 DIAGNOSIS — S00.461A: Primary | ICD-10-CM

## 2023-05-20 DIAGNOSIS — Z28.310: ICD-10-CM

## 2023-05-20 DIAGNOSIS — I45.19: ICD-10-CM

## 2023-05-20 LAB
ALBUMIN SERPL-MCNC: 4.5 GM/DL (ref 3.2–4.5)
ALP SERPL-CCNC: 98 U/L (ref 40–136)
ALT SERPL-CCNC: 18 U/L (ref 0–55)
BASOPHILS # BLD AUTO: 0 10^3/UL (ref 0–0.1)
BASOPHILS NFR BLD AUTO: 0 % (ref 0–10)
BILIRUB SERPL-MCNC: 0.5 MG/DL (ref 0.1–1)
BUN/CREAT SERPL: 15
CALCIUM SERPL-MCNC: 9.8 MG/DL (ref 8.5–10.1)
CHLORIDE SERPL-SCNC: 106 MMOL/L (ref 98–107)
CO2 SERPL-SCNC: 20 MMOL/L (ref 21–32)
CREAT SERPL-MCNC: 0.82 MG/DL (ref 0.6–1.3)
EOSINOPHIL # BLD AUTO: 0.1 10^3/UL (ref 0–0.3)
EOSINOPHIL NFR BLD AUTO: 1 % (ref 0–10)
ERYTHROCYTE [SEDIMENTATION RATE] IN BLOOD: 9 MM/HR (ref 0–15)
GFR SERPLBLD BASED ON 1.73 SQ M-ARVRAT: 112 ML/MIN
GLUCOSE SERPL-MCNC: 94 MG/DL (ref 70–105)
HCT VFR BLD CALC: 42 % (ref 40–54)
HGB BLD-MCNC: 14 G/DL (ref 13.3–17.7)
LYMPHOCYTES # BLD AUTO: 3.3 10^3/UL (ref 1–4)
LYMPHOCYTES NFR BLD AUTO: 33 % (ref 12–44)
MANUAL DIFFERENTIAL PERFORMED BLD QL: NO
MCH RBC QN AUTO: 31 PG (ref 25–34)
MCHC RBC AUTO-ENTMCNC: 33 G/DL (ref 32–36)
MCV RBC AUTO: 94 FL (ref 80–99)
MONOCYTES # BLD AUTO: 0.7 10^3/UL (ref 0–1)
MONOCYTES NFR BLD AUTO: 6 % (ref 0–12)
NEUTROPHILS # BLD AUTO: 6 10^3/UL (ref 1.8–7.8)
NEUTROPHILS NFR BLD AUTO: 59 % (ref 42–75)
PLATELET # BLD: 419 10^3/UL (ref 130–400)
PMV BLD AUTO: 10.2 FL (ref 9–12.2)
POTASSIUM SERPL-SCNC: 3.6 MMOL/L (ref 3.6–5)
PROT SERPL-MCNC: 7.3 GM/DL (ref 6.4–8.2)
SODIUM SERPL-SCNC: 137 MMOL/L (ref 135–145)
WBC # BLD AUTO: 10.1 10^3/UL (ref 4.3–11)

## 2023-05-20 PROCEDURE — 86668 FRANCISELLA TULARENSIS: CPT

## 2023-05-20 PROCEDURE — 36415 COLL VENOUS BLD VENIPUNCTURE: CPT

## 2023-05-20 PROCEDURE — 85025 COMPLETE CBC W/AUTO DIFF WBC: CPT

## 2023-05-20 PROCEDURE — 86618 LYME DISEASE ANTIBODY: CPT

## 2023-05-20 PROCEDURE — 86666 EHRLICHIA ANTIBODY: CPT

## 2023-05-20 PROCEDURE — 93005 ELECTROCARDIOGRAM TRACING: CPT

## 2023-05-20 PROCEDURE — 80053 COMPREHEN METABOLIC PANEL: CPT

## 2023-05-20 PROCEDURE — 84484 ASSAY OF TROPONIN QUANT: CPT

## 2023-05-20 PROCEDURE — 71045 X-RAY EXAM CHEST 1 VIEW: CPT

## 2023-05-20 PROCEDURE — 86141 C-REACTIVE PROTEIN HS: CPT

## 2023-05-20 PROCEDURE — 87636 SARSCOV2 & INF A&B AMP PRB: CPT

## 2023-05-20 PROCEDURE — 86757 RICKETTSIA ANTIBODY: CPT

## 2023-05-20 PROCEDURE — 85652 RBC SED RATE AUTOMATED: CPT

## 2023-05-20 NOTE — DIAGNOSTIC IMAGING REPORT
INDICATION: SOB



COMPARISON: 01/11/2022.



FINDINGS: Single frontal view of the chest demonstrates normal

heart size and pulmonary vascularity. The lungs are well aerated

and clear. No large pleural effusion or pneumothorax is seen. The

visualized osseous structures show no acute abnormality.



IMPRESSION: No acute cardiopulmonary process.  



Dictated by: 



  Dictated on workstation # [id number]

## 2023-05-20 NOTE — ED GENERAL
General


Chief Complaint:  Bite-Animal/Human/Insect


Stated Complaint:  TICK BITE FEVER/SOA/BODYACHES


Nursing Triage Note:  


C/O TICK BITE 1 MONTH AGO TO RIGHT EAR. C/O BILATERAL EAR PAIN, TINGLING IN 


HANDS, BILATERAL KNEE SWELLING, FEVER X4 DAYS, CHEST DISCOMFORT X2 DAYS.


Source of Information:  Patient


Exam Limitations:  No Limitations





History of Present Illness


Date Seen by Provider:  May 20, 2023


Time Seen by Provider:  19:33


Initial Comments


Patient is a 42-year-old male who presents to ED for flulike symptoms.  Patient 

states 1 month ago he removed to Ticks from his head and right axilla.  Patient 

states 2 weeks ago he pulled a tick from his right outer ear.  Started 

developing flulike symptoms after he removed the tick of body aches, chills, 

weakness, numbness and tingling into the joints with joint pain.  Denies any 

joint swelling or redness.  Reports some intermittent chest pain short of breath

nausea diarrhea.  States his back feels stiff his neck feels stiff.  Denies any 

visual changes, known cardiac history, recent travels or surgeries.  He states 

he was at the [employer] at the time when he got bit.  States he just has 

decreased energy levels.  Denies of any known cardiac history, history of COPD 

or asthma.  Normal urination.  Eating and drinking at home but just decreased 

appetite.  Reports some crusting to the right outer ear.  Denies of any hearing 

loss, ear ringing, focal neural deficits, seizures.  Patient states he has felt 

feverish over the past 4 days.  Patient denies of any rash





Allergies and Home Medications


Allergies


Coded Allergies:  


     prednisone (Verified  Allergy, Unknown, 8/8/22)





Patient Home Medication List


Home Medication List Reviewed:  Yes


Doxycycline Monohydrate (Doxycycline Monohydrate) 100 Mg Tablet, 100 MG PO BID


   Prescribed by: SVETA JIMENES on 5/20/23 2037


Lisdexamfetamine Dimesylate (Vyvanse) 20 Mg Capsule, Unknown Dose PO, (Reported)


   Entered as Reported by: PREAM BRODERICK on 5/20/23 1924


   Last Action: New Order


Lithium Carbonate (Lithium Carbonate) 150 Mg Capsule, Unknown Dose PO, 

(Reported)


   Entered as Reported by: PREMA BRODERICK on 5/20/23 1924


   Last Action: New Order


Mirtazapine (Mirtazapine) 15 Mg Tab.rapdis, Unknown Dose PO, (Reported)


   Entered as Reported by: PREMA BRODERICK on 5/20/23 1924


   Last Action: New Order


Quetiapine Fumarate (Seroquel) 25 Mg Tablet, Unknown Dose PO, (Reported)


   Entered as Reported by: PREMA BRODERICK on 5/20/23 1924


   Last Action: New Order


Discontinued Medications


Cyclobenzaprine HCl (Cyclobenzaprine HCl) 10 Mg Tablet, 10 MG PO TID


   Discontinued Reason: No Longer Taking


   Prescribed by: Venice Langford on 4/20/23 1942


   Last Action: Discontinued


Ketorolac Tromethamine (Ketorolac Tromethamine) 10 Mg Tablet, 10 MG PO Q6H PRN 

for PAIN


   Discontinued Reason: No Longer Taking


   Prescribed by: Venice Langford on 4/20/23 1942


   Last Action: Discontinued


Pantoprazole Sodium (Protonix) 40 Mg Tablet.dr, 40 MG PO DAILY


   Discontinued Reason: No Longer Taking


   Prescribed by: LIZ KRAMER on 3/18/22 1135


   Last Action: Discontinued





Review of Systems


Review of Systems


Constitutional:  No chills, No diaphoresis, No fever, No malaise, No weakness


EENTM:  No blurred vision, No double vision


Gastrointestinal:  No abdominal pain; diarrhea; No nausea, No vomiting


Genitourinary:  No decreased output, No discharge, No dysuria, No frequency


Musculoskeletal:  back pain, joint pain, muscle pain, muscle stiffness


Skin:  No change in color, No change in hair/nails





Past Medical-Social-Family Hx


Patient Social History


Tobacco Use?:  Yes


Substance use?:  Yes


Substance type:  Marijuana


Alcohol Use?:  Yes


Alcohol Frequency:  Rarely


Pt feels they are or have been:  No





Immunizations Up To Date


Tetanus Booster (TDap):  Unknown


First/Initial COVID19 Vaccinat:  NONE





Seasonal Allergies


Seasonal Allergies:  No





Past Medical History


Surgery/Hospitalization HX:  


MULTIPLE ORTHO SX. PMH: BIPOLAR, ADHD


Surgeries:  Yes (LT KNEE AND QUAD, RT SHOULDER, RT KNEE, LT WRIST, JAW 

RECONSTRUCTION)


Orthopedic


Respiratory:  No


Cardiac:  Yes


Endocarditis


Neurological:  No


Reproductive Disorders:  No


Gastrointestinal:  No


Musculoskeletal:  Yes


Fractures


Endocrine:  No


HEENT:  No


Cancer:  No


Psychosocial:  Yes


ADD/ADHD, Anxiety, Bipolar, Depression


Integumentary:  No


Blood Disorders:  No





Family Medical History





Patient reports no known family medical history.


No Pertinent Family Hx





Physical Exam


Vital Signs





Vital Signs - First Documented








 5/20/23





 19:16


 


Temp 36.4


 


Pulse 88


 


Resp 14


 


B/P (MAP) 123/84 (97)


 


Pulse Ox 99


 


O2 Delivery Room Air





Capillary Refill : Less Than 3 Seconds


Height, Weight, BMI


Height: 6'1"


Weight: 188lbs. 6.4oz. 85.059759kd; 29.00 BMI


Method:Estimated


General Appearance:  No Apparent Distress, WD/WN


Eyes:  Bilateral Eye Normal Inspection, Bilateral Eye PERRL, Bilateral Eye EOMI


HEENT:  PERRL/EOMI, TMs Normal, Pharynx Normal, Other (Right outer ear with mild

erythema and crusting.  No function of mass.)


Neck:  Full Range of Motion, Normal Inspection, Non Tender, Supple


Respiratory:  Chest Non Tender, Lungs Clear, Normal Breath Sounds, No Accessory 

Muscle Use, No Respiratory Distress


Cardiovascular:  Regular Rate, Rhythm, No Edema, No Gallop, No JVD


Gastrointestinal:  Normal Bowel Sounds, No Organomegaly, No Pulsatile Mass, Non 

Tender, Soft


Extremity:  Normal Capillary Refill, Normal Inspection, Normal Range of Motion, 

Non Tender


Neurologic/Psychiatric:  Alert, Oriented x3, No Motor/Sensory Deficits, Normal 

Mood/Affect, CNs II-XII Norm as Tested


Skin:  Other (No evidence of rash.  Crusting noted to right inner tragus.)





Procedures/Interventions





   Suture Size:  5-0





Progress/Results/Core Measures


Suspected Sepsis


SIRS


Temperature: 


Pulse: 88 


Respiratory Rate: 14


 


Laboratory Tests


5/20/23 20:08: White Blood Count 10.1


Blood Pressure 123 /84 


Mean: 97


 





Laboratory Tests


5/20/23 19:50: 


Creatinine 0.82, Total Bilirubin 0.5


5/20/23 20:08: Platelet Count 419H








Results/Orders


Lab Results





Laboratory Tests








Test


 5/20/23


19:50 5/20/23


19:56 5/20/23


20:08 Range/Units


 


 


Sodium Level 137    135-145  MMOL/L


 


Potassium Level 3.6    3.6-5.0  MMOL/L


 


Chloride Level 106      MMOL/L


 


Carbon Dioxide Level 20 L   21-32  MMOL/L


 


Anion Gap 11    5-14  MMOL/L


 


Blood Urea Nitrogen 12    7-18  MG/DL


 


Creatinine


 0.82 


 


 


 0.60-1.30


MG/DL


 


Estimat Glomerular Filtration


Rate 112 


 


 


  





 


BUN/Creatinine Ratio 15     


 


Glucose Level 94      MG/DL


 


Calcium Level 9.8    8.5-10.1  MG/DL


 


Corrected Calcium 9.4    8.5-10.1  MG/DL


 


Total Bilirubin 0.5    0.1-1.0  MG/DL


 


Aspartate Amino Transf


(AST/SGOT) 22 


 


 


 5-34  U/L





 


Alanine Aminotransferase


(ALT/SGPT) 18 


 


 


 0-55  U/L





 


Alkaline Phosphatase 98      U/L


 


Troponin I < 0.028    <0.028  NG/ML


 


C-Reactive Protein High


Sensitivity 0.14 


 


 


 0.00-0.50


MG/DL


 


Total Protein 7.3    6.4-8.2  GM/DL


 


Albumin 4.5    3.2-4.5  GM/DL


 


Influenza Type A (RT-PCR)  Not Detected   Not Detecte  


 


Influenza Type B (RT-PCR)  Not Detected   Not Detecte  


 


SARS-CoV-2 RNA (RT-PCR)  Not Detected   Not Detecte  


 


White Blood Count


 


 


 10.1 


 4.3-11.0


10^3/uL


 


Red Blood Count


 


 


 4.51 


 4.30-5.52


10^6/uL


 


Hemoglobin   14.0  13.3-17.7  g/dL


 


Hematocrit   42  40-54  %


 


Mean Corpuscular Volume   94  80-99  fL


 


Mean Corpuscular Hemoglobin   31  25-34  pg


 


Mean Corpuscular Hemoglobin


Concent 


 


 33 


 32-36  g/dL





 


Red Cell Distribution Width   13.4  10.0-14.5  %


 


Platelet Count


 


 


 419 H


 130-400


10^3/uL


 


Mean Platelet Volume   10.2  9.0-12.2  fL


 


Immature Granulocyte % (Auto)   0   %


 


Neutrophils (%) (Auto)   59  42-75  %


 


Lymphocytes (%) (Auto)   33  12-44  %


 


Monocytes (%) (Auto)   6  0-12  %


 


Eosinophils (%) (Auto)   1  0-10  %


 


Basophils (%) (Auto)   0  0-10  %


 


Neutrophils # (Auto)


 


 


 6.0 


 1.8-7.8


10^3/uL


 


Lymphocytes # (Auto)


 


 


 3.3 


 1.0-4.0


10^3/uL


 


Monocytes # (Auto)


 


 


 0.7 


 0.0-1.0


10^3/uL


 


Eosinophils # (Auto)


 


 


 0.1 


 0.0-0.3


10^3/uL


 


Basophils # (Auto)


 


 


 0.0 


 0.0-0.1


10^3/uL


 


Immature Granulocyte # (Auto)


 


 


 0.0 


 0.0-0.1


10^3/uL


 


Erythrocyte Sedimentation Rate   9  0-15  MM/HR








My Orders





Orders - CHATA DUARTE


Cbc With Automated Diff (5/20/23 19:29)


Comprehensive Metabolic Panel (5/20/23 19:29)


Hs C Reactive Protein (5/20/23 19:29)


Erythrocyte Sedimentation Rate (5/20/23 19:29)


Tick Panel With Lyme Eia (5/20/23 19:29)


Troponin I Moshe (5/20/23 19:29)


Ekg Tracing (5/20/23 19:29)


Chest 1 View, Ap/Pa Only (5/20/23 19:29)


Covid 19 Inhouse Test (5/20/23 19:35)


Influenza A And B By Pcr (5/20/23 19:35)


Doxycycline Hyclate Tablet (Vibramycin T (5/20/23 20:34)





Vital Signs/I&O











 5/20/23 5/20/23





 19:16 20:40


 


Temp 36.4 36.7


 


Pulse 88 68


 


Resp 14 16


 


B/P (MAP) 123/84 (97) 127/81


 


Pulse Ox 99 99


 


O2 Delivery Room Air Room Air





Capillary Refill : Less Than 3 Seconds








Blood Pressure Mean:                    97











Departure


Communication (PCP)


Reviewed previous ER visits, H&P, lab testing.  Patient concern for tick bite 1 

month ago.  He states he pulled the ticks off.  Unlikely attached for long 

period of time as he states they were moving.  Patient states he did pull a tick

to the right outer ear two weeks ago.  Unknown length that the tick was on his 

ear.  He does have some crusting to this area.  No evidence of a bull's-eye type

rash or rash to the upper extremities.  Patient with multiple complaints joint 

pain, fatigue, weakness, chest pain, cough, abdominal pain, fever.  Patient is 

afebrile on arrival.  Denies taking any anti-inflammatories before arrival.  

Stable vital signs.  Does have some cellulitis to the right outer ear.  Due to 

current complaint tick panel, generalized lab work, troponin and EKG.  No known 

cardiac history.  Heart score of 1.  Low cardiac risk factors.  EKG showed 

normal sinus rhythm with incomplete right bundle branch block.  85 bpm, QRS 

duration 106 MS, QTc 413 MS.  Normal troponin. He was not tachycardic, hypoxic. 

Denies recent travels, leg pain, or surgeries. negative risk factors for PE.  

Chest x-ray negative for pneumonia, pneumothorax.  Due to flulike symptoms COVID

influenza was ordered which were unremarkable.  CBC, CMP, ESR and CRP grossly 

unremarkable.  No red flag findings noted on lab work.  Due to the cellulitis to

the right outer ear will discharge with doxycycline.  This will cover tickborne 

illness as well.  Tick panel with Lyme disease is currently pending.  Recommend 

follow-up your PCP in 2 to 3 days for reevaluation.  Do not necessarily think 

patient needs inpatient at this time.  Unclear if this is secondary to a 

tickborne illness, viral. Associated mild cellulits left outer ear. Discuss 

neosporin twice a day for 14 days or until rash is gone.   Denies history of 

autoimmune disease.  No evidence of redness or swelling of the joints.  Moving 

all extremities without difficulties.  Continue with anti-inflammatories at 

home.  Follow-up with PCP in 2 to 3 days for reevaluation. Return precautions 

were discussed.





Impression





   Primary Impression:  


   Tick bite


Disposition:  01 HOME, SELF-CARE


Condition:  Stable





Departure-Patient Inst.


Decision time for Depature:  20:35


Referrals:  


[facility] (PCP/Family)


Primary Care Physician


Patient Instructions:  Cellulitis (Skin Infection), Adult (DC)





Add. Discharge Instructions:  


Recommend follow-up with your PCP in 2 to 3 days for reevaluation.  If any 

worsening symptoms return back to ED.  Tylenol or ibuprofen for pain





All discharge instructions reviewed with patient and/or family. Voiced 

understanding.


Scripts


Doxycycline Monohydrate (Doxycycline Monohydrate) 100 Mg Tablet


100 MG PO BID for 10 Days, #20 TAB


   Prov: CHATA DUARTE         5/20/23











CHATA DUARTE          May 20, 2023 19:35

## 2023-09-18 ENCOUNTER — HOSPITAL ENCOUNTER (EMERGENCY)
Dept: HOSPITAL 75 - ER | Age: 43
Discharge: HOME | End: 2023-09-18
Payer: SELF-PAY

## 2023-09-18 VITALS — BODY MASS INDEX: 24.13 KG/M2 | HEIGHT: 73.03 IN | WEIGHT: 182.1 LBS

## 2023-09-18 VITALS — DIASTOLIC BLOOD PRESSURE: 78 MMHG | SYSTOLIC BLOOD PRESSURE: 127 MMHG
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
## 2023-09-18 DIAGNOSIS — Z28.310: ICD-10-CM

## 2023-09-18 DIAGNOSIS — M25.462: Primary | ICD-10-CM

## 2023-09-18 PROCEDURE — 73562 X-RAY EXAM OF KNEE 3: CPT

## 2023-09-18 NOTE — DIAGNOSTIC IMAGING REPORT
CLINICAL HISTORY: Left knee pain. Fall.



COMPARISON: 02/18/2017.



TECHNIQUE: Three views of the left knee.



FINDINGS: 

There is no acute fracture or dislocation of the left knee.

Alignment is anatomic. The imaged joint spaces are preserved.

Moderate left knee joint effusion.



IMPRESSION: 

1. No acute fracture or dislocation in the left knee.

2. Moderate left knee joint effusion.



Dictated by: 



  Dictated on workstation # [id number]

## 2023-09-18 NOTE — ED LOWER EXTREMITY
General


Chief Complaint:  Lower Extremity


Stated Complaint:  LT KNEE INJ


Source:  patient


Exam Limitations:  no limitations





History of Present Illness


Date Seen by Provider:  Sep 18, 2023


Time Seen by Provider:  19:23


Initial Comments


Patient is a 43-year-old male who presents ED with left knee injury.  This 

occurred 1 hour ago.  Patient was playing catch with his daughter stepped in a 

pothole twisted his left knee outwards.  Patient felt a pop.  Patient has had 2 

MCL repairs, ACL repair and quadricep repair of his left knee.  History of 

previous injuries in the past.  Reports large area of swelling.  Denies of any 

distal numbness and tingling.  Denies taking anything for pain.  Patient is able

to stand and bear weight but difficult.  Patient denies of any calf pain, ankle 

pain.





Allergies and Home Medications


Allergies


Coded Allergies:  


     prednisone (Verified  Allergy, Unknown, 8/8/22)





Patient Home Medication List


Home Medication List Reviewed:  Yes


Doxycycline Monohydrate (Doxycycline Monohydrate) 100 Mg Tablet, 100 MG PO BID


   Prescribed by: SVETA JIMENES on 5/20/23 2037


Lisdexamfetamine Dimesylate (Vyvanse) 20 Mg Capsule, Unknown Dose PO, (Reported)


   Entered as Reported by: PREMA BRODERICK on 5/20/23 1924


Lithium Carbonate (Lithium Carbonate) 150 Mg Capsule, Unknown Dose PO, 

(Reported)


   Entered as Reported by: PREMA BRODERICK on 5/20/23 1924


Mirtazapine (Mirtazapine) 15 Mg Tab.rapdis, Unknown Dose PO, (Reported)


   Entered as Reported by: PREMA BRODERICK on 5/20/23 1924


Naproxen (Naproxen) 500 Mg Tablet, 500 MG PO Q12H


   Prescribed by: SVETA JIMENES on 9/18/23 1954


Quetiapine Fumarate (Seroquel) 25 Mg Tablet, Unknown Dose PO, (Reported)


   Entered as Reported by: PREMA BRODERICK on 5/20/23 1924





Review of Systems


Constitutional:  No chills, No diaphoresis


EENTM:  No hearing loss, No double vision


Respiratory:  No cough, No dyspnea on exertion


Cardiovascular:  No chest pain


Gastrointestinal:  No abdominal pain, No diarrhea, No nausea, No vomiting


Genitourinary:  No decreased output, No discharge


Musculoskeletal:  joint pain, joint swelling, muscle pain


Skin:  No change in color, No change in hair/nails





All Other Systems Reviewed


Negative Unless Noted:  Yes





Past Medical-Social-Family Hx


Immunizations Up To Date


Tetanus Booster (TDap):  Unknown


First/Initial COVID19 Vaccinat:  NONE





Seasonal Allergies


Seasonal Allergies:  No





Past Medical History


Surgery/Hospitalization HX:  


MULTIPLE ORTHO SX. PMH: BIPOLAR, ADHD


Surgeries:  Yes (LT KNEE AND QUAD, RT SHOULDER, RT KNEE, LT WRIST, JAW 

RECONSTRUCTION)


Orthopedic


Respiratory:  No


Cardiac:  Yes


Endocarditis


Neurological:  No


Reproductive Disorders:  No


Gastrointestinal:  No


Musculoskeletal:  Yes


Fractures


Endocrine:  No


HEENT:  No


Cancer:  No


Psychosocial:  Yes


ADD/ADHD, Anxiety, Bipolar, Depression


Integumentary:  No


Blood Disorders:  No





Family Medical History





Patient reports no known family medical history.


No Pertinent Family Hx





Physical Exam


Vital Signs





Vital Signs - First Documented








 9/18/23





 19:17


 


Temp 36.2


 


Pulse 74


 


Resp 16


 


B/P (MAP) 136/74 (94)


 


Pulse Ox 98


 


O2 Delivery Room Air





Capillary Refill :


Height, Weight, BMI


Height: 6'1"


Weight: 188lbs. 6.4oz. 85.051232la; 29.00 BMI


Method:Estimated


General Appearance:  WD/WN, no apparent distress


HEENT:  PERRL/EOMI, normal ENT inspection, TMs normal, pharynx normal


Neck:  non-tender, full range of motion, supple


Cardiovascular:  regular rate, rhythm, no edema, no gallop, no JVD


Respiratory:  chest non-tender, lungs clear, normal breath sounds, no 

respiratory distress, no accessory muscle use


Gastrointestinal:  normal bowel sounds, non tender, soft, no organomegaly


Back:  normal inspection, no CVA tenderness


Knees:  left knee joint effusion, left knee pain, left knee soft tissue 

tenderness, left knee swelling, left knee other (Flexion to 70 degrees with full

extension.  Large joint effusion)


Ankles:  bilateral ankle non-tender, bilateral ankle normal inspection, 

bilateral ankle normal range of motion


Feet:  bilateral foot non-tender, bilateral foot normal inspection, bilateral 

foot normal range of motion; left foot other (Neurovascular intact left leg)


Neurologic/Psychiatric:  CNs II-XII nml as tested, no motor/sensory deficits, 

alert, normal mood/affect, oriented x 3


Skin:  normal color, warm/dry





Procedures/Interventions





   Suture Size:  5-0





Progress/Results/Core Measures


Results/Orders


My Orders





Orders - CHATA DUARTE


Knee, Left, 3 Views (9/18/23 19:22)


Hydrocodone/Apap 5/325 Tablet (Hydrocod (9/18/23 19:30)





Medications Given in ED





Current Medications








 Medications  Dose


 Ordered  Sig/Hosea


 Route  Start Time


 Stop Time Status Last Admin


Dose Admin


 


 Acetaminophen/


 Hydrocodone Bitart  1 ea  ONCE  ONCE


 PO  9/18/23 19:30


 9/18/23 19:31 DC 9/18/23 20:00


1 EA








Vital Signs/I&O











 9/18/23





 19:17


 


Temp 36.2


 


Pulse 74


 


Resp 16


 


B/P (MAP) 136/74 (94)


 


Pulse Ox 98


 


O2 Delivery Room Air











Departure


Communication (PCP)


Differential diagnosis, left knee fracture, left knee sprain, ligament injury.  

Patient with injury to left knee.  History of previous MCL, ACL and quadricep 

tendon injury with repari.  Patient was planned with his daughter when his foot 

got caught in a hole and felt immediate pop.  Pain with movement.  Notable 

swelling.  On exam tenderness throughout the anterior part of the knee.  Flexion

seems to be limited.  Neurovascular intact.  No calf tenderness.  Achilles 

tendon is intact.  Patella tracking noted.  X-ray was obtained.  Patient 

received a dose of pain medication.  X-ray did not show any acute fracture or 

dislocation.  Moderate joint effusion.  I am concerned for ligament or meniscus 

injury.  Patient was placed in knee immobilizer.  Crutches was provided.  

Recommend a knee brace for support.  Recommend follow-up with orthopedic for 

further evaluation.  Recommend MRI for further evaluation.  Ice and elevate.  

Return precaution were discussed.  Will discharge with pain medication.  Limited

activities.





Impression





   Primary Impression:  


   Knee pain


Disposition:  01 HOME, SELF-CARE


Condition:  Stable





Departure-Patient Inst.


Decision time for Depature:  19:30


Referrals:  


[facility] (PCP/Family)


Primary Care Physician








YARELIS ROQUE MD


Patient Instructions:  Knee Pain ED





Add. Discharge Instructions:  


Recommend orthopedic outpatient follow-up.  Knee brace for support.





All discharge instructions reviewed with patient and/or family. Voiced 

understanding.


Scripts


Naproxen (Naproxen) 500 Mg Tablet


500 MG PO Q12H for 10 Days, #20 TAB


   Prov: CHATA DUARTE         9/18/23











CHATA DUARTE          Sep 18, 2023 19:25

## 2024-05-29 NOTE — HISTORY & PHYSICAL
CIRO SHAFFER III MED STUDENT 3/16/22 1331:


HPI


History of Present Illness:


Pt is a 42yo male w/ pmhx of ADD/ADHD, bipolar, depression and alcohol abuse pre

sents to the ED for complaints of sharp/burning abdominal pain that started at 

0400 this AM. Pt states that it woke him from sleep, describes it as a 

sharp/burning sensation located in the epigastric region w/ radiation to the 

rest of his abdomen. Does endorse significant diaphoresis, tachycardia, s

hortness of breath and tightening of his back muscles in response to the pain. 

States it was a constant pain for about 30 minutes then would wax and wane as he

changed position. Could not find a comfortable position to keep the pain 

completely away. Notes that he was able to pass gas and have a small BM however 

this did not alleviate the symptoms. Was previously sober for 4 years but 

started drinking again within the last 10 weeks following a broken rip. Endorses

1/2 pint - pint of alcohol per day. Last drink monday. Went to AA on tuesday and

was supposed to go again today however pain was too severe and decided to go to 

ED. Notes that he vomited in the ED and comments that it was red in color though

he had only had clear liquids prior to this.


Source:  patient


Exam Limitations:  no limitations


Date seen by provider:  Mar 16, 2022


Attending Physician


Santy Oconnell MD


PCP


Maurice Arriaga MD


Consult





Date of Admission


Mar 16, 2022 at 10:35





Home Medications


Home Medications


Reviewed patient Home Medication Reconciliation performed by pharmacy medication

reconciliations technician and/or nursing.


Patients Allergies have been reviewed.





Allergies


Coded Allergies:  


     prednisone (Verified  Allergy, Unknown, 2/3/10)





PMH-Social-Family Hx


Patient Social History


Drug of Choice:  history of IV drug use, ACID


Smoking Status:  Current Everyday Smoker (1 ppd)


2nd Hand Smoke Exposure:  Yes


Recent Hopitalizations:  No


Alcohol Use?:  Yes


Substance type:  Marijuana


Tobacco type used:  Cigarettes


Have you traveled recently?:  No





Immunizations Up To Date


Tetanus Booster (TDap):  Unknown


Influenza Vaccine Up-to-Date:  No; Not Current





Past Medical History





ADHD/ADD


Bipolar


Anxiety


Depression


Hx IV Drug Use


Endocarditis


Multiple Fracture Hx





Family Medical History


Significant Family History:  No Pertinent Family Hx


Family History:  


Patient reports no known family medical history.





Review of Systems ([facility])


Constitutional:  chills, diaphoresis; No dizziness; fever (subjective, never 

recorded a temperature. just felt warm ); No malaise, No weakness


EENTM:  no symptoms reported


Respiratory:  No cough, No hemoptysis; short of breath; No stridor, No wheezing


Cardiovascular:  chest pain; No edema; palpitations; No syncope


Gastrointestinal:  abdominal pain (diffuse, more in epigastric, LUQ and some 

RUQ. described as sharp and burning), constipation (some constipation lately, 

litzy had small BM this AM ); No jaundice; nausea, vomiting (red tinged in the 

ED on admission )


Genitourinary:  no symptoms reported


Musculoskeletal:  back pain (associated with the tightness he felt when pain was

at most intense, better after pain meds given )


Skin:  no symptoms reported


Psychiatric/Neurological:  No Symptoms Reported





Reviewed Test Results


Reviewed Test Results


Lab











 3/16/22 3/16/22





 07:30 11:25


 


Temp 35.3 


 


Pulse 88 69


 


Resp 16 16


 


B/P (MAP) 167/105 (125) 131/78


 


Pulse Ox 98 98


 


O2 Delivery Room Air Room Air








Laboratory Tests


3/16/22 07:34














Laboratory Tests








Test


 3/16/22


07:32 3/16/22


07:34 3/16/22


07:37 Range/Units


 


 


Serum Alcohol < 10    <10  MG/DL


 


White Blood Count


 


 10.1 


 


 4.3-11.0


10^3/uL


 


Red Blood Count


 


 4.80 


 


 4.30-5.52


10^6/uL


 


Hemoglobin  15.0   13.3-17.7  g/dL


 


Hematocrit  46   40-54  %


 


Mean Corpuscular Volume  95   80-99  fL


 


Mean Corpuscular Hemoglobin  31   25-34  pg


 


Mean Corpuscular Hemoglobin


Concent 


 33 


 


 32-36  g/dL





 


Red Cell Distribution Width  13.8   10.0-14.5  %


 


Platelet Count


 


 388 


 


 130-400


10^3/uL


 


Mean Platelet Volume  10.2   9.0-12.2  fL


 


Immature Granulocyte % (Auto)  0    %


 


Neutrophils (%) (Auto)  63   42-75  %


 


Lymphocytes (%) (Auto)  26   12-44  %


 


Monocytes (%) (Auto)  9   0-12  %


 


Eosinophils (%) (Auto)  2   0-10  %


 


Basophils (%) (Auto)  0   0-10  %


 


Neutrophils # (Auto)


 


 6.4 


 


 1.8-7.8


10^3/uL


 


Lymphocytes # (Auto)


 


 2.7 


 


 1.0-4.0


10^3/uL


 


Monocytes # (Auto)


 


 0.9 


 


 0.0-1.0


10^3/uL


 


Eosinophils # (Auto)


 


 0.2 


 


 0.0-0.3


10^3/uL


 


Basophils # (Auto)


 


 0.0 


 


 0.0-0.1


10^3/uL


 


Immature Granulocyte # (Auto)


 


 0.0 


 


 0.0-0.1


10^3/uL


 


Sodium Level  137   135-145  MMOL/L


 


Potassium Level  4.0   3.6-5.0  MMOL/L


 


Chloride Level  106     MMOL/L


 


Carbon Dioxide Level  19 L  21-32  MMOL/L


 


Anion Gap  12   5-14  MMOL/L


 


Blood Urea Nitrogen  12   7-18  MG/DL


 


Creatinine


 


 0.92 


 


 0.60-1.30


MG/DL


 


Estimat Glomerular Filtration


Rate 


 107 


 


  





 


BUN/Creatinine Ratio  13    


 


Glucose Level  116 H    MG/DL


 


Calcium Level  9.9   8.5-10.1  MG/DL


 


Corrected Calcium  9.5   8.5-10.1  MG/DL


 


Total Bilirubin  0.4   0.1-1.0  MG/DL


 


Aspartate Amino Transf


(AST/SGOT) 


 20 


 


 5-34  U/L





 


Alanine Aminotransferase


(ALT/SGPT) 


 17 


 


 0-55  U/L





 


Alkaline Phosphatase  89     U/L


 


C-Reactive Protein High


Sensitivity 


 0.10 


 


 0.00-0.50


MG/DL


 


Total Protein  7.3   6.4-8.2  GM/DL


 


Albumin  4.5   3.2-4.5  GM/DL


 


Lipase  28   8-78  U/L


 


Urine Color   YELLOW   


 


Urine Clarity   CLEAR   


 


Urine pH   6.0  5-9  


 


Urine Specific Gravity   >=1.030  1.016-1.022  


 


Urine Protein   TRACE H NEGATIVE  


 


Urine Glucose (UA)   NEGATIVE  NEGATIVE  


 


Urine Ketones   NEGATIVE  NEGATIVE  


 


Urine Nitrite   NEGATIVE  NEGATIVE  


 


Urine Bilirubin   NEGATIVE  NEGATIVE  


 


Urine Urobilinogen   0.2  < = 1.0  MG/DL


 


Urine Leukocyte Esterase   NEGATIVE  NEGATIVE  


 


Urine RBC (Auto)   NEGATIVE  NEGATIVE  


 


Urine RBC   NONE   /HPF


 


Urine WBC   NONE   /HPF


 


Urine Squamous Epithelial


Cells 


 


 NONE 


  /HPF





 


Urine Crystals   PRESENT H  /LPF


 


Urine Calcium Oxalate Crystals   FEW H  /LPF


 


Urine Bacteria   NEGATIVE   /HPF


 


Urine Casts   NONE   /LPF


 


Urine Mucus   NEGATIVE   /LPF


 


Urine Culture Indicated   NO   








Radiology


Date of Exam:03/16/22





CT ABDOMEN/PELVIS W








PROCEDURE: CT abdomen and pelvis with contrast.





TECHNIQUE: Multiple contiguous axial images were obtained through


the abdomen and pelvis after administration of intravenous


contrast. Auto Exposure Controls were utilized during the CT exam


to meet ALARA standards for radiation dose reduction. All CT


scans use one or more of the following dose optimizing


techniques: automated exposure control, MA and/or KvP adjustment


based on patient size and exam type or iterative reconstruction.





INDICATION:  Upper abdominal pain, vomiting.





COMPARISON: 04/27/2021





FINDINGS: Bibasilar dependent atelectasis. The liver and spleen


are unremarkable. The adrenal glands are unremarkable. The


pancreatic duct is mildly dilated measuring near 4 mm throughout.


No definitive obstructing mass lesion. The common bile duct is


within normal limits and there is no abnormal dilatation of the


gallbladder. This is unchanged from the prior examination. The


pancreas is otherwise unremarkable. The gallbladder is


unremarkable. The kidneys and bilateral ureters are unremarkable.


Retroaortic left renal vein is noted. Mild scattered vascular


calcifications without aneurysmal dilatation of the abdominal


aorta. Small fat-containing umbilical hernia. The appendix is


unremarkable. The urinary bladder is unremarkable. Mural


thickening of the rectum without adjacent inflammatory stranding.


No bowel obstruction or pneumatosis. No significant adenopathy,


free air, or free fluid within the abdomen or pelvis. No new


acute osseous abnormality with chronic fracturing of a few right


transverse processes within the lumbar spine.





IMPRESSION: Borderline dilatation of the pancreatic duct, which


is unchanged from the prior examination. No definite obstructing


mass or stone identified on this examination. This is of


uncertain etiology or significance. This could simply be


physiologic for the patient. Recommend correlation with


laboratory values. If there is clinical concern for true ductal


obstruction and distention, then ERCP versus MRCP would be


recommended.





Poor distention of the rectum versus less likely proctitis. No


bowel obstruction.





Physical Exam-([facility])


Physical Exam


Vital Signs





                          VS - Last 72 Hours, by Label








 3/16/22 3/16/22





 07:30 11:25


 


Temp 35.3 


 


Pulse 88 69


 


Resp 16 16


 


B/P (MAP) 167/105 (125) 131/78


 


Pulse Ox 98 98


 


O2 Delivery Room Air Room Air





Capillary Refill : Less Than 3 Seconds


General Appearance:  WD/WN, no apparent distress


Eyes:  Bilateral Eye Normal Inspection, Bilateral Eye PERRL, Bilateral Eye EOMI


HEENT:  PERRL/EOMI, pharynx normal; No scleral icterus (R), No scleral icterus 

(L)


Neck:  non-tender, full range of motion, supple, normal inspection


Respiratory:  chest non-tender, lungs clear, normal breath sounds, no 

respiratory distress, no accessory muscle use


Cardiovascular:  normal peripheral pulses, regular rate, rhythm, no edema, no 

gallop, no JVD, no murmur


Peripheral Pulses:  2+ Radial Pulses (R), 2+ Radial Pulses (L)


Gastrointestinal:  normal bowel sounds, soft; No distended, No guarding, No 

rebound; tenderness (Epigastric, LUQ and RUQ), other (No murphys sign, no 

mcburneys tenderness)


Back:  normal inspection, no CVA tenderness, no vertebral tenderness


Extremities:  normal range of motion, non-tender, normal inspection, no pedal 

edema, normal capillary refill


Neurologic/Psychiatric:  CNs II-XII nml as tested, alert, normal mood/affect, 

oriented x 3


Skin:  normal color, warm/dry; No jaundice


Lymphatic:  no adenopathy





Assessment/Plan


Assessment/Plan





(1) Upper abdominal pain


Status:  Acute


Assessment & Plan:  Described as sharp/burning sensation. Initially constant and

>10/10 in pain when it started at 0400. Has since changed to waxing and waning 

though still painful. Did have episode of red tinged vomit reported by patient. 

No history of EGD. Denies any significant NSAID use PTA. Dilated pancreatic duct

seen on CT w/o obvious stone or elevation in pancreatic enzymes. Concern for 

early pancreatitis





3/16 - started on IVF and protonix 40mg BID in the ED. May consider EGD to 

assess for signs of ulceration given history of alcohol and reported red tinged 

vomit. If nausea permits, may trial patient on clear fluids and progress as 

tolerated given no elevation in pancreatic enzymes for acute pancreatitis. 





(2) Dilation of pancreatic duct


Status:  Acute


Assessment & Plan:  Uncertain etiology whether early signs of developing 

pancreatitis vs normal anatomical variant for this patient. Given history of 

alcohol abuse will start patient on PPI, IVF and recommend cessation of alcohol.







(3) Alcohol abuse


Status:  Chronic


Assessment & Plan:  Pt admits to being 4 years sober prior to restarting 

drinking within the last 10 weeks following a rib fracture. Endorses 1/2 pint to

a pint for 4-5 days per week. Does not have history of withdrawal seizures but 

does get shaky and tachycardic when withdrawing from alcohol. Saw AA on tuesday 

prior to coming to hospital. Knows he needs to go back to AA after d/c





3/16 - serum alcohol <10. Last reported drink on 3/14. Will monitor for any 

signs of withdrawal while admitted with [facility]. supplementing with thiamine, 

folate, mg in IVF








SANTY OCONNELL MD 3/16/22 2129:


HPI


History of Present Illness:


Time Seen by Provider:  13:10





Home Medications


Allergies


Coded Allergies:  


     prednisone (Verified  Allergy, Unknown, 2/3/10)





PMH-Social-Family Hx


Past Medical History





EtOHism





Family Medical History


Family History:  


Patient reports no known family medical history.





Review of Systems ([facility])


EENTM:  no symptoms reported


Respiratory:  short of breath


Cardiovascular:  palpitations


Gastrointestinal:  abdominal pain (diffuse, more in epigastric, LUQ and some 

RUQ. described as sharp and burning), constipation (some constipation lately, 

thoug had small BM this AM ), heartburn, nausea


Genitourinary:  no symptoms reported


Musculoskeletal:  back pain (associated with the tightness he felt when pain was

at most intense, better after pain meds given )


Skin:  no symptoms reported


Psychiatric/Neurological:  No Symptoms Reported





Physical Exam-([facility])


Physical Exam


General Appearance:  WD/WN, no apparent distress


HEENT:  PERRL/EOMI


Neck:  non-tender, full range of motion, supple


Respiratory:  chest non-tender, lungs clear, normal breath sounds, no 

respiratory distress, no accessory muscle use


Cardiovascular:  normal peripheral pulses, regular rate, rhythm, no edema, no 

murmur


Gastrointestinal:  normal bowel sounds, soft, other (No murphys sign, no 

mcburneys tenderness)


Back:  no CVA tenderness, no vertebral tenderness


Extremities:  normal range of motion, normal inspection, no pedal edema, normal 

capillary refill


Neurologic/Psychiatric:  CNs II-XII nml as tested, normal mood/affect, oriented 

x 3, other (fine tremor bilateral hands)


Skin:  normal color


Lymphatic:  no adenopathy





Assessment/Plan


Assessment/Plan


Admission Status:  Observation





Supervisory-Addendum Brief


Verification & Attestation


Participated in pt care:  history, physical


Personally performed:  exam, history


Care discussed with:  Medical Student


Procedures:  n/a


Verification and Attestation of Medical Student E/M Service





A medical student performed and documented this service in my presence. I 

reviewed and verified all information documented by the medical student and made

modifications to such information, when appropriate. I personally performed the 

physical exam and medical decision making. 





 Santy Oconnell, Mar 16, 2022,21:27





Epigastric abdominal pain: CT findings of possible early pancreatitis, started o

n PPI, consider EGD since patient reports some blood in vomit, will discuss case

in the AM with surgery, may schedule as outpatient





EtOHism: CIWS protocol, Ativan protocol, Folic acid and thiamine replacement











CIRO SHAFFER III MED STUDENT   Mar 16, 2022 13:31


SANTY OCONNELL MD               Mar 16, 2022 21:29 